# Patient Record
Sex: MALE | Race: WHITE | NOT HISPANIC OR LATINO | URBAN - METROPOLITAN AREA
[De-identification: names, ages, dates, MRNs, and addresses within clinical notes are randomized per-mention and may not be internally consistent; named-entity substitution may affect disease eponyms.]

---

## 2018-04-18 ENCOUNTER — EMERGENCY (EMERGENCY)
Facility: HOSPITAL | Age: 33
LOS: 1 days | Discharge: HOME | End: 2018-04-18
Attending: EMERGENCY MEDICINE | Admitting: EMERGENCY MEDICINE
Payer: MEDICAID

## 2018-04-18 VITALS
HEART RATE: 99 BPM | OXYGEN SATURATION: 98 % | WEIGHT: 190.04 LBS | TEMPERATURE: 98 F | RESPIRATION RATE: 18 BRPM | HEIGHT: 71 IN | DIASTOLIC BLOOD PRESSURE: 77 MMHG | SYSTOLIC BLOOD PRESSURE: 108 MMHG

## 2018-04-18 PROCEDURE — 99283 EMERGENCY DEPT VISIT LOW MDM: CPT

## 2018-04-18 PROCEDURE — 73130 X-RAY EXAM OF HAND: CPT

## 2018-04-18 PROCEDURE — 99283 EMERGENCY DEPT VISIT LOW MDM: CPT | Mod: 25

## 2018-04-18 NOTE — ED ADULT NURSE NOTE - MUSCULOSKELETAL WDL
Full range of motion of upper and lower extremities, no joint tenderness/swelling. pain right wrist area

## 2018-04-19 PROCEDURE — 73130 X-RAY EXAM OF HAND: CPT | Mod: 26,RT

## 2018-04-19 NOTE — ED ADULT NURSE REASSESSMENT NOTE - NS ED NURSE REASSESS COMMENT FT1
seen and examined by dr. tan. to have x-ray. pt declines pain medications at this time.
pt seen and examined by dr. tan. xray completed. md aware of the results. pt d/c home.

## 2018-10-08 ENCOUNTER — NON-APPOINTMENT (OUTPATIENT)
Age: 33
End: 2018-10-08

## 2018-10-08 ENCOUNTER — APPOINTMENT (OUTPATIENT)
Dept: INTERNAL MEDICINE | Facility: CLINIC | Age: 33
End: 2018-10-08
Payer: SELF-PAY

## 2018-10-08 ENCOUNTER — FORM ENCOUNTER (OUTPATIENT)
Age: 33
End: 2018-10-08

## 2018-10-08 VITALS
OXYGEN SATURATION: 99 % | WEIGHT: 181 LBS | DIASTOLIC BLOOD PRESSURE: 64 MMHG | HEART RATE: 47 BPM | HEIGHT: 69 IN | TEMPERATURE: 97.8 F | BODY MASS INDEX: 26.81 KG/M2 | SYSTOLIC BLOOD PRESSURE: 104 MMHG

## 2018-10-08 DIAGNOSIS — Z78.9 OTHER SPECIFIED HEALTH STATUS: ICD-10-CM

## 2018-10-08 DIAGNOSIS — Z87.891 PERSONAL HISTORY OF NICOTINE DEPENDENCE: ICD-10-CM

## 2018-10-08 DIAGNOSIS — F41.9 ANXIETY DISORDER, UNSPECIFIED: ICD-10-CM

## 2018-10-08 DIAGNOSIS — I49.9 CARDIAC ARRHYTHMIA, UNSPECIFIED: ICD-10-CM

## 2018-10-08 DIAGNOSIS — Z83.3 FAMILY HISTORY OF DIABETES MELLITUS: ICD-10-CM

## 2018-10-08 DIAGNOSIS — Z98.890 OTHER SPECIFIED POSTPROCEDURAL STATES: ICD-10-CM

## 2018-10-08 DIAGNOSIS — K21.9 GASTRO-ESOPHAGEAL REFLUX DISEASE W/OUT ESOPHAGITIS: ICD-10-CM

## 2018-10-08 DIAGNOSIS — Z82.3 FAMILY HISTORY OF STROKE: ICD-10-CM

## 2018-10-08 PROCEDURE — 83014 H PYLORI DRUG ADMIN: CPT

## 2018-10-08 PROCEDURE — 99204 OFFICE O/P NEW MOD 45 MIN: CPT | Mod: 25

## 2018-10-08 PROCEDURE — 93000 ELECTROCARDIOGRAM COMPLETE: CPT

## 2018-10-08 PROCEDURE — 36415 COLL VENOUS BLD VENIPUNCTURE: CPT

## 2018-10-08 NOTE — HISTORY OF PRESENT ILLNESS
[FreeTextEntry8] : chronic epigastric pain\par - started 2 yrs ago while abroad in [place]\par - became sick w/ NBNB vomiting and nausea\par - was evaluated by MD, US showed GB polyp was told it was food poisoning and to have f/u US\par - since then has had epigastric pain. non radiating, no alleviating factors, worse w/ eating or drinking carbonated beverages\par - a/w acid reflux\par \par anxiety/arrhythmia\par - treated w/ meditation and yoga\par - palpitations are transient but noticeable\par - unsure if anxiety causes palpitations or palpitations cause anxiety\par \par new onset constipation\par - previously had BM twice a day\par - over past 2 weeks developed acute change, goes maybe 1/week and passes small pebble sized BMs\par

## 2018-10-08 NOTE — PHYSICAL EXAM
[No Acute Distress] : no acute distress [Well Nourished] : well nourished [Well Developed] : well developed [Well-Appearing] : well-appearing [Normal Sclera/Conjunctiva] : normal sclera/conjunctiva [EOMI] : extraocular movements intact [No Respiratory Distress] : no respiratory distress  [Clear to Auscultation] : lungs were clear to auscultation bilaterally [No Accessory Muscle Use] : no accessory muscle use [Normal Rate] : normal rate  [No Edema] : there was no peripheral edema [Soft] : abdomen soft [Non-distended] : non-distended [No Masses] : no abdominal mass palpated [No Rash] : no rash [Normal Gait] : normal gait [Coordination Grossly Intact] : coordination grossly intact [Normal Affect] : the affect was normal [Alert and Oriented x3] : oriented to person, place, and time [Normal Mood] : the mood was normal [Normal Insight/Judgement] : insight and judgment were intact [de-identified] : mild epigastric tenderness to deep palpation

## 2018-10-08 NOTE — PHYSICAL EXAM
[No Acute Distress] : no acute distress [Well Nourished] : well nourished [Well Developed] : well developed [Well-Appearing] : well-appearing [Normal Sclera/Conjunctiva] : normal sclera/conjunctiva [EOMI] : extraocular movements intact [No Respiratory Distress] : no respiratory distress  [Clear to Auscultation] : lungs were clear to auscultation bilaterally [No Accessory Muscle Use] : no accessory muscle use [Normal Rate] : normal rate  [No Edema] : there was no peripheral edema [Soft] : abdomen soft [Non-distended] : non-distended [No Masses] : no abdominal mass palpated [No Rash] : no rash [Normal Gait] : normal gait [Coordination Grossly Intact] : coordination grossly intact [Normal Affect] : the affect was normal [Alert and Oriented x3] : oriented to person, place, and time [Normal Mood] : the mood was normal [Normal Insight/Judgement] : insight and judgment were intact [de-identified] : mild epigastric tenderness to deep palpation

## 2018-10-08 NOTE — HEALTH RISK ASSESSMENT
[No falls in past year] : Patient reported no falls in the past year [0] : 2) Feeling down, depressed, or hopeless: Not at all (0) [[id number]] : 0

## 2018-10-08 NOTE — HEALTH RISK ASSESSMENT
[No falls in past year] : Patient reported no falls in the past year [0] : 2) Feeling down, depressed, or hopeless: Not at all (0) [[id number]] : 0

## 2018-10-09 ENCOUNTER — OUTPATIENT (OUTPATIENT)
Dept: OUTPATIENT SERVICES | Facility: HOSPITAL | Age: 33
LOS: 1 days | End: 2018-10-09
Payer: COMMERCIAL

## 2018-10-09 ENCOUNTER — APPOINTMENT (OUTPATIENT)
Dept: ULTRASOUND IMAGING | Facility: HOSPITAL | Age: 33
End: 2018-10-09
Payer: SELF-PAY

## 2018-10-09 PROCEDURE — 74018 RADEX ABDOMEN 1 VIEW: CPT | Mod: 26

## 2018-10-09 PROCEDURE — 74018 RADEX ABDOMEN 1 VIEW: CPT

## 2018-10-09 PROCEDURE — 76700 US EXAM ABDOM COMPLETE: CPT | Mod: 26

## 2018-10-09 PROCEDURE — 76700 US EXAM ABDOM COMPLETE: CPT

## 2018-10-11 LAB
25(OH)D3 SERPL-MCNC: 24.7 NG/ML
ALBUMIN SERPL ELPH-MCNC: 4.9 G/DL
ALP BLD-CCNC: 51 U/L
ALT SERPL-CCNC: 16 U/L
AMYLASE/CREAT SERPL: 54 U/L
ANION GAP SERPL CALC-SCNC: 11 MMOL/L
AST SERPL-CCNC: 13 U/L
BASOPHILS # BLD AUTO: 0.02 K/UL
BASOPHILS NFR BLD AUTO: 0.4 %
BILIRUB SERPL-MCNC: 1.5 MG/DL
BUN SERPL-MCNC: 12 MG/DL
CALCIUM SERPL-MCNC: 10 MG/DL
CHLORIDE SERPL-SCNC: 105 MMOL/L
CHOLEST SERPL-MCNC: 146 MG/DL
CHOLEST/HDLC SERPL: 2.7 RATIO
CO2 SERPL-SCNC: 29 MMOL/L
CREAT SERPL-MCNC: 0.77 MG/DL
CRP SERPL-MCNC: <0.1 MG/DL
EOSINOPHIL # BLD AUTO: 0.14 K/UL
EOSINOPHIL NFR BLD AUTO: 2.9 %
GLUCOSE SERPL-MCNC: 86 MG/DL
HBA1C MFR BLD HPLC: 5.2 %
HCT VFR BLD CALC: 43.9 %
HCV AB SER QL: NONREACTIVE
HCV S/CO RATIO: 0.1 S/CO
HDLC SERPL-MCNC: 54 MG/DL
HGB BLD-MCNC: 15 G/DL
IMM GRANULOCYTES NFR BLD AUTO: 0.2 %
LDLC SERPL CALC-MCNC: 81 MG/DL
LPL SERPL-CCNC: 26 U/L
LYMPHOCYTES # BLD AUTO: 2.24 K/UL
LYMPHOCYTES NFR BLD AUTO: 45.7 %
MAN DIFF?: NORMAL
MCHC RBC-ENTMCNC: 29.4 PG
MCHC RBC-ENTMCNC: 34.2 GM/DL
MCV RBC AUTO: 86.1 FL
MONOCYTES # BLD AUTO: 0.39 K/UL
MONOCYTES NFR BLD AUTO: 8 %
NEUTROPHILS # BLD AUTO: 2.1 K/UL
NEUTROPHILS NFR BLD AUTO: 42.8 %
PLATELET # BLD AUTO: 220 K/UL
POTASSIUM SERPL-SCNC: 4.6 MMOL/L
PROT SERPL-MCNC: 7.2 G/DL
RBC # BLD: 5.1 M/UL
RBC # FLD: 13.1 %
SODIUM SERPL-SCNC: 145 MMOL/L
STRONGYLOIDES AB SER IA-ACNC: NEGATIVE
T4 FREE SERPL-MCNC: 1.4 NG/DL
TRIGL SERPL-MCNC: 55 MG/DL
TSH SERPL-ACNC: 2.74 UIU/ML
UREA BREATH TEST QL: POSITIVE
WBC # FLD AUTO: 4.9 K/UL

## 2018-11-19 ENCOUNTER — MEDICATION RENEWAL (OUTPATIENT)
Age: 33
End: 2018-11-19

## 2018-11-21 ENCOUNTER — APPOINTMENT (OUTPATIENT)
Dept: INTERNAL MEDICINE | Facility: CLINIC | Age: 33
End: 2018-11-21
Payer: SELF-PAY

## 2018-11-21 ENCOUNTER — LABORATORY RESULT (OUTPATIENT)
Age: 33
End: 2018-11-21

## 2018-11-21 VITALS
SYSTOLIC BLOOD PRESSURE: 101 MMHG | DIASTOLIC BLOOD PRESSURE: 68 MMHG | BODY MASS INDEX: 25.18 KG/M2 | OXYGEN SATURATION: 99 % | HEIGHT: 69 IN | TEMPERATURE: 98 F | WEIGHT: 170 LBS | HEART RATE: 74 BPM

## 2018-11-21 DIAGNOSIS — Z00.00 ENCOUNTER FOR GENERAL ADULT MEDICAL EXAMINATION W/OUT ABNORMAL FINDINGS: ICD-10-CM

## 2018-11-21 PROCEDURE — 99214 OFFICE O/P EST MOD 30 MIN: CPT

## 2018-11-21 RX ORDER — AMOXICILLIN 500 MG/1
500 TABLET, FILM COATED ORAL TWICE DAILY
Qty: 56 | Refills: 0 | Status: DISCONTINUED | COMMUNITY
Start: 2018-10-11 | End: 2018-11-21

## 2018-11-21 RX ORDER — CLARITHROMYCIN 500 MG/1
500 TABLET, FILM COATED ORAL
Qty: 28 | Refills: 0 | Status: DISCONTINUED | COMMUNITY
Start: 2018-10-11 | End: 2018-11-21

## 2018-11-21 RX ORDER — OMEPRAZOLE 20 MG/1
20 CAPSULE, DELAYED RELEASE ORAL TWICE DAILY
Qty: 28 | Refills: 0 | Status: DISCONTINUED | COMMUNITY
Start: 2018-10-11 | End: 2018-11-21

## 2018-11-21 RX ORDER — OMEPRAZOLE 40 MG/1
40 CAPSULE, DELAYED RELEASE ORAL
Qty: 30 | Refills: 0 | Status: DISCONTINUED | COMMUNITY
Start: 2018-10-08 | End: 2018-11-21

## 2018-11-21 NOTE — PHYSICAL EXAM
[No Acute Distress] : no acute distress [Well Nourished] : well nourished [Well Developed] : well developed [Well-Appearing] : well-appearing [Normal Sclera/Conjunctiva] : normal sclera/conjunctiva [PERRL] : pupils equal round and reactive to light [EOMI] : extraocular movements intact [Normal Outer Ear/Nose] : the outer ears and nose were normal in appearance [No JVD] : no jugular venous distention [No Respiratory Distress] : no respiratory distress  [No Accessory Muscle Use] : no accessory muscle use [Normal Posterior Cervical Nodes] : no posterior cervical lymphadenopathy [Normal Gait] : normal gait [Normal Affect] : the affect was normal [Alert and Oriented x3] : oriented to person, place, and time [Normal Insight/Judgement] : insight and judgment were intact

## 2018-11-21 NOTE — HISTORY OF PRESENT ILLNESS
[FreeTextEntry1] : follow up [de-identified] : unintentional weight loss\par - around 9-10lbs over the past month\par - states he is not hungr and when he does eat is full very quickly\par - wife is concerned about parasites, last eosinophil count normal, strongyloids ab negative.\par \par H pylori\par - only completed 10 of 14 days\par - says he felt better and did not want to complete medication, was too many pills\par \par Consitpation\par - takes miralax and prune juice\par - never went to go see gastro

## 2018-11-26 LAB
APPEARANCE: CLEAR
BILIRUBIN URINE: NEGATIVE
BLOOD URINE: NEGATIVE
COLOR: ABNORMAL
GLUCOSE QUALITATIVE U: NEGATIVE MG/DL
KETONES URINE: NEGATIVE
LEUKOCYTE ESTERASE URINE: NEGATIVE
NITRITE URINE: NEGATIVE
PH URINE: 5.5
PROTEIN URINE: NEGATIVE MG/DL
SPECIFIC GRAVITY URINE: 1.02
UROBILINOGEN URINE: 1 MG/DL

## 2019-01-14 ENCOUNTER — APPOINTMENT (OUTPATIENT)
Dept: INTERNAL MEDICINE | Facility: CLINIC | Age: 34
End: 2019-01-14
Payer: MEDICAID

## 2019-01-14 VITALS
BODY MASS INDEX: 24.88 KG/M2 | HEART RATE: 72 BPM | OXYGEN SATURATION: 99 % | SYSTOLIC BLOOD PRESSURE: 99 MMHG | TEMPERATURE: 98.1 F | DIASTOLIC BLOOD PRESSURE: 61 MMHG | WEIGHT: 168 LBS | HEIGHT: 69 IN

## 2019-01-14 DIAGNOSIS — G89.29 EPIGASTRIC PAIN: ICD-10-CM

## 2019-01-14 DIAGNOSIS — R10.13 EPIGASTRIC PAIN: ICD-10-CM

## 2019-01-14 DIAGNOSIS — K29.70 GASTRITIS, UNSPECIFIED, W/OUT BLEEDING: ICD-10-CM

## 2019-01-14 PROCEDURE — 99214 OFFICE O/P EST MOD 30 MIN: CPT | Mod: 25

## 2019-01-14 PROCEDURE — 83014 H PYLORI DRUG ADMIN: CPT

## 2019-01-14 RX ORDER — OMEPRAZOLE 20 MG/1
20 CAPSULE, DELAYED RELEASE ORAL
Qty: 30 | Refills: 0 | Status: ACTIVE | COMMUNITY
Start: 2019-01-14 | End: 1900-01-01

## 2019-01-14 RX ORDER — POLYETHYLENE GLYCOL 3350 17 G/17G
17 POWDER, FOR SOLUTION ORAL DAILY
Qty: 30 | Refills: 3 | Status: COMPLETED | COMMUNITY
Start: 2018-10-11 | End: 2019-01-14

## 2019-01-14 NOTE — PHYSICAL EXAM
[No Acute Distress] : no acute distress [Well Nourished] : well nourished [Well Developed] : well developed [Normal Sclera/Conjunctiva] : normal sclera/conjunctiva [EOMI] : extraocular movements intact [No Respiratory Distress] : no respiratory distress  [Normal Rate] : normal rate  [No Edema] : there was no peripheral edema [Soft] : abdomen soft [Non Tender] : non-tender [Non-distended] : non-distended [No Masses] : no abdominal mass palpated [Normal Supraclavicular Nodes] : no supraclavicular lymphadenopathy [Normal Anterior Cervical Nodes] : no anterior cervical lymphadenopathy [Normal Gait] : normal gait [Normal Affect] : the affect was normal [Normal Mood] : the mood was normal [Normal Insight/Judgement] : insight and judgment were intact

## 2019-01-14 NOTE — HISTORY OF PRESENT ILLNESS
[FreeTextEntry1] : follow up [de-identified] : H pylori\par - s/p EGD 1/7/19 awaiting path results\par - noted to have gastritis, started on PPI\par \par weight loss\par - continues to lose weight\par - avoid dairy, has good appetitie otherwise\par - scheduled for CT abdo/pelvis by GI\par - denies B symptoms\par \par Constipation\par - better w/ increased fiber and dried fruit in diet.

## 2019-01-17 LAB — UREA BREATH TEST QL: POSITIVE

## 2019-04-30 ENCOUNTER — APPOINTMENT (OUTPATIENT)
Dept: INTERNAL MEDICINE | Facility: CLINIC | Age: 34
End: 2019-04-30
Payer: MEDICAID

## 2019-04-30 DIAGNOSIS — A04.8 OTHER SPECIFIED BACTERIAL INTESTINAL INFECTIONS: ICD-10-CM

## 2019-04-30 PROCEDURE — 83014 H PYLORI DRUG ADMIN: CPT

## 2019-05-03 LAB — UREA BREATH TEST QL: POSITIVE

## 2019-05-17 ENCOUNTER — EMERGENCY (EMERGENCY)
Facility: HOSPITAL | Age: 34
LOS: 1 days | Discharge: HOME | End: 2019-05-17
Attending: EMERGENCY MEDICINE | Admitting: EMERGENCY MEDICINE
Payer: COMMERCIAL

## 2019-05-17 VITALS
RESPIRATION RATE: 18 BRPM | HEART RATE: 65 BPM | SYSTOLIC BLOOD PRESSURE: 120 MMHG | OXYGEN SATURATION: 100 % | TEMPERATURE: 98 F | DIASTOLIC BLOOD PRESSURE: 67 MMHG

## 2019-05-17 VITALS
OXYGEN SATURATION: 100 % | DIASTOLIC BLOOD PRESSURE: 47 MMHG | RESPIRATION RATE: 15 BRPM | TEMPERATURE: 98 F | HEART RATE: 59 BPM | SYSTOLIC BLOOD PRESSURE: 104 MMHG

## 2019-05-17 LAB
ALBUMIN SERPL ELPH-MCNC: 4.4 G/DL — SIGNIFICANT CHANGE UP (ref 3.3–5)
ALP SERPL-CCNC: 64 U/L — SIGNIFICANT CHANGE UP (ref 40–120)
ALT FLD-CCNC: 13 U/L — SIGNIFICANT CHANGE UP (ref 4–41)
ANION GAP SERPL CALC-SCNC: 9 MMO/L — SIGNIFICANT CHANGE UP (ref 7–14)
AST SERPL-CCNC: 17 U/L — SIGNIFICANT CHANGE UP (ref 4–40)
BASOPHILS # BLD AUTO: 0.02 K/UL — SIGNIFICANT CHANGE UP (ref 0–0.2)
BASOPHILS NFR BLD AUTO: 0.3 % — SIGNIFICANT CHANGE UP (ref 0–2)
BILIRUB SERPL-MCNC: 0.8 MG/DL — SIGNIFICANT CHANGE UP (ref 0.2–1.2)
BUN SERPL-MCNC: 16 MG/DL — SIGNIFICANT CHANGE UP (ref 7–23)
CALCIUM SERPL-MCNC: 9.6 MG/DL — SIGNIFICANT CHANGE UP (ref 8.4–10.5)
CHLORIDE SERPL-SCNC: 104 MMOL/L — SIGNIFICANT CHANGE UP (ref 98–107)
CO2 SERPL-SCNC: 29 MMOL/L — SIGNIFICANT CHANGE UP (ref 22–31)
CREAT SERPL-MCNC: 0.8 MG/DL — SIGNIFICANT CHANGE UP (ref 0.5–1.3)
EOSINOPHIL # BLD AUTO: 0.29 K/UL — SIGNIFICANT CHANGE UP (ref 0–0.5)
EOSINOPHIL NFR BLD AUTO: 3.9 % — SIGNIFICANT CHANGE UP (ref 0–6)
GLUCOSE SERPL-MCNC: 85 MG/DL — SIGNIFICANT CHANGE UP (ref 70–99)
HCT VFR BLD CALC: 40.1 % — SIGNIFICANT CHANGE UP (ref 39–50)
HGB BLD-MCNC: 13.5 G/DL — SIGNIFICANT CHANGE UP (ref 13–17)
IMM GRANULOCYTES NFR BLD AUTO: 0.3 % — SIGNIFICANT CHANGE UP (ref 0–1.5)
LIDOCAIN IGE QN: 32.6 U/L — SIGNIFICANT CHANGE UP (ref 7–60)
LYMPHOCYTES # BLD AUTO: 2.71 K/UL — SIGNIFICANT CHANGE UP (ref 1–3.3)
LYMPHOCYTES # BLD AUTO: 36.4 % — SIGNIFICANT CHANGE UP (ref 13–44)
MCHC RBC-ENTMCNC: 28 PG — SIGNIFICANT CHANGE UP (ref 27–34)
MCHC RBC-ENTMCNC: 33.7 % — SIGNIFICANT CHANGE UP (ref 32–36)
MCV RBC AUTO: 83.2 FL — SIGNIFICANT CHANGE UP (ref 80–100)
MONOCYTES # BLD AUTO: 0.52 K/UL — SIGNIFICANT CHANGE UP (ref 0–0.9)
MONOCYTES NFR BLD AUTO: 7 % — SIGNIFICANT CHANGE UP (ref 2–14)
NEUTROPHILS # BLD AUTO: 3.89 K/UL — SIGNIFICANT CHANGE UP (ref 1.8–7.4)
NEUTROPHILS NFR BLD AUTO: 52.1 % — SIGNIFICANT CHANGE UP (ref 43–77)
NRBC # FLD: 0.02 K/UL — SIGNIFICANT CHANGE UP (ref 0–0)
PLATELET # BLD AUTO: 234 K/UL — SIGNIFICANT CHANGE UP (ref 150–400)
PMV BLD: 10 FL — SIGNIFICANT CHANGE UP (ref 7–13)
POTASSIUM SERPL-MCNC: 4 MMOL/L — SIGNIFICANT CHANGE UP (ref 3.5–5.3)
POTASSIUM SERPL-SCNC: 4 MMOL/L — SIGNIFICANT CHANGE UP (ref 3.5–5.3)
PROT SERPL-MCNC: 7.1 G/DL — SIGNIFICANT CHANGE UP (ref 6–8.3)
RBC # BLD: 4.82 M/UL — SIGNIFICANT CHANGE UP (ref 4.2–5.8)
RBC # FLD: 12.4 % — SIGNIFICANT CHANGE UP (ref 10.3–14.5)
SODIUM SERPL-SCNC: 142 MMOL/L — SIGNIFICANT CHANGE UP (ref 135–145)
WBC # BLD: 7.45 K/UL — SIGNIFICANT CHANGE UP (ref 3.8–10.5)
WBC # FLD AUTO: 7.45 K/UL — SIGNIFICANT CHANGE UP (ref 3.8–10.5)

## 2019-05-17 PROCEDURE — 76705 ECHO EXAM OF ABDOMEN: CPT | Mod: 26

## 2019-05-17 PROCEDURE — 99284 EMERGENCY DEPT VISIT MOD MDM: CPT

## 2019-05-17 PROCEDURE — 71046 X-RAY EXAM CHEST 2 VIEWS: CPT | Mod: 26

## 2019-05-17 RX ORDER — FAMOTIDINE 10 MG/ML
20 INJECTION INTRAVENOUS ONCE
Refills: 0 | Status: COMPLETED | OUTPATIENT
Start: 2019-05-17 | End: 2019-05-17

## 2019-05-17 RX ORDER — SODIUM CHLORIDE 9 MG/ML
1000 INJECTION INTRAMUSCULAR; INTRAVENOUS; SUBCUTANEOUS ONCE
Refills: 0 | Status: COMPLETED | OUTPATIENT
Start: 2019-05-17 | End: 2019-05-17

## 2019-05-17 RX ADMIN — FAMOTIDINE 20 MILLIGRAM(S): 10 INJECTION INTRAVENOUS at 23:08

## 2019-05-17 RX ADMIN — Medication 30 MILLILITER(S): at 23:08

## 2019-05-17 RX ADMIN — SODIUM CHLORIDE 1000 MILLILITER(S): 9 INJECTION INTRAMUSCULAR; INTRAVENOUS; SUBCUTANEOUS at 23:10

## 2019-05-17 RX ADMIN — SODIUM CHLORIDE 1000 MILLILITER(S): 9 INJECTION INTRAMUSCULAR; INTRAVENOUS; SUBCUTANEOUS at 19:12

## 2019-05-17 NOTE — CONSULT NOTE ADULT - ASSESSMENT
33M w/hx of H pylori and PUD (diagnosed ~3 months ago on endoscopy - Dr. Manzanares of GI, s/p abx treatment x 2, not eradicated on recent urease breath test, no longer taking PPI) presents with 1-month h/o right-sided abdominal pain in setting of 60-lb weight loss, ongoing workup for ?malignancy.    - doubt cholecystitis in setting of normal WBC, no fever, no wall thickening/pericholecystic fluid on U/S, non-tender  - patient does continue to endorse right-sided pain  - recommend he follow up with his primary gastroenterologist given H Pylori that had been diagnosed recently reportedly not eradicated, no longer taking a PPI, and reports MRI done to "look at lymph nodes" recently as well as additional blood tests (and ?bone marrow biopsy that patient reports PCP was planning to do  - counseled patient to avoid fatty foods as this seems to aggravate his pain, and f/u with his PCP and GI; if episodes persistent he may elect to schedule laparoscopic cholecystectomy with Dr. Murray -- he was counseled that the gallstones may not be causing his pain and he may continue to suffer despite cholecystectomy if due to another etiology  - recommend PO challenge and d/c home, to f/u with Dr. Murray as outpatient  - d/w Dr. Murray, patient, and ED staff    BJ Randhawa, R4  [id number]

## 2019-05-17 NOTE — ED ADULT TRIAGE NOTE - CHIEF COMPLAINT QUOTE
Pt c/o R sided abd pain x 1 month that worsened. Denies N/V/D, urinary symptoms, fever/chills. Hx: H. pylori

## 2019-05-17 NOTE — ED ADULT NURSE NOTE - OBJECTIVE STATEMENT
Patient rec'd with RUQ pain x 1 month worse after eating and laying flat, denies n/v/d. Told last week from MRI has "gallbladder clusters" but benign. Also diagnosed with H.pylori last year. Denies fever/chills. Denies dysuria, abnormal bm.

## 2019-05-17 NOTE — ED PROVIDER NOTE - NSFOLLOWUPINSTRUCTIONS_ED_ALL_ED_FT
1. Follow up with Dr. Joshi as soon as possible.   2. Take Omeprazole as prescribed.  3. Return to the emergency department for high fevers, worsening pain or inability to take solids or liquids. 1. Follow up with Dr. Joshi as soon as possible.   [address] [facility] [address], [place] [id number] [phone]  2. Take Omeprazole as prescribed.  3. Return to the emergency department for high fevers, worsening pain or inability to take solids or liquids.

## 2019-05-17 NOTE — ED PROVIDER NOTE - ATTENDING CONTRIBUTION TO CARE
33M o/w healthy presents with epigastric and RUQ abdominal pain. Pain ongoing for the last month, is worse w PO intake. H/o PUD that continued after abx therapy, now no PPI. Had an MRI abd last week for chronic gastritis, told he had gallstones, has never had treatment or seen a surgeon. On exam well appearing, nad, mmm, rrr, lungs clear, abd + RUQ ttp, 2+ pulses, no edema, no rash, alert, speech clear. U/s c/w gallstones, seen by surgery w plan for outpatient f/u. Will recommend treatment for known gastritis, given GI f/u.

## 2019-05-17 NOTE — ED PROVIDER NOTE - OBJECTIVE STATEMENT
34 y/o M hx of PUD s/p tx for H. pylori presents with RUQ pain.     Patient states he has been experiencing persistent pain for a month, describes as crampy quality that is worse after food intake. He notes pain is different from burning, sharp pain that he felt was he was dx with H. pylori. Patient had an MRI one week prior and was told he had stones in his gallbladder but were 'small' No fevers, chills, N/V, or diarrhea but notes cough.

## 2019-05-17 NOTE — CONSULT NOTE ADULT - SUBJECTIVE AND OBJECTIVE BOX
GENERAL SURGERY CONSULT NOTE  Attending: Dr. Murray  Service: B team  Contact: [phone]    HPI: 33M w/hx of H pylori and PUD (diagnosed ~3 months ago on endoscopy - Dr. Manzanares of GI, s/p abx treatment x 2, not eradicated on recent urease breath test, no longer taking PPI) presents with 1-month h/o right-sided abdominal pain. Feels the pain is there all the time but made worse by fatty food intake. Usually lasts about an hour, sometimes radiates to the back. The pain kept him from sleep yesterday so he came to the ED today. Denies fevers, chills, nausea, emesis, changes in color of skin/urine/stool, dysuria, diarrhea/constipation. Also reports 60-lb weight loss over 18 months. During that time he had been in the country of [place] with his wife. Colonoscopy done at time of endoscopy a few months ago reportedly showed some small polyps. He also notes an MRI done last week that showed some "lymph nodes" near his stomach. He is having flatus and normal BMs.    ROS: negative except as noted above.    PMH/PSH  Peptic ulcer disease  Anxiety  s/p tonsillectomy    MEDICATIONS  none    Allergies  No Known Allergies    Social  Lives with wife and parents. Quit smoking 5 years ago. Denies ETOH.    Physical Exam  T(C): 36.6 (05-17-19 @ 20:36), Max: 36.8 (05-17-19 @ 17:14)  HR: 59 (05-17-19 @ 20:36) (59 - 65)  BP: 104/47 (05-17-19 @ 20:36) (104/47 - 120/67)  RR: 15 (05-17-19 @ 20:36) (15 - 18)  SpO2: 100% (05-17-19 @ 20:36) (100% - 100%)    Gen: NAD  Neuro: AAOx3  HEENT: normocephalic, atraumatic, no scleral icterus  CV: S1, S2, RRR  Pulm: CTA B/L  Abd: Soft, ND, NT, no rebound, no guarding, no palpable organomegaly/masses  Ext: warm, no edema    LABS                        13.5   7.45  )-----------( 234      ( 17 May 2019 19:30 )             40.1     05-17    142  |  104  |  16  ----------------------------<  85  4.0   |  29  |  0.80    Ca    9.6      17 May 2019 19:30    TPro  7.1  /  Alb  4.4  /  TBili  0.8  /  DBili  x   /  AST  17  /  ALT  13  /  AlkPhos  64  05-17    Lipase, Serum: 32.6 U/L (05.17.19 @ 19:30)    IMAGING  < from: US Abdomen Limited (05.17.19 @ 19:48) >  IMPRESSION:   Cholelithiasis without evidence of cholecystitis.    < end of copied text >

## 2019-05-17 NOTE — ED PROVIDER NOTE - CLINICAL SUMMARY MEDICAL DECISION MAKING FREE TEXT BOX
34 y/o M hx of PUD s/p tx for H. pylori presents with RUQ pain.   Appears to be distinct from epigastric pain associated with H.pylori. Likely biliary colic vs. pleurisy vs. MSK; PERC negative  Will require lab work, RUQ US, CXR, reassessment.

## 2019-05-17 NOTE — ED PROVIDER NOTE - PHYSICAL EXAMINATION
GENERAL: no acute distress  HEAD:  Atraumatic, Normocephalic  EYES: EOMI, PERRLA  ENT: MMM; oropharynx clear  NECK: Supple, No JVD  CHEST/LUNG: Clear to auscultation bilaterally; No wheeze  HEART: Regular rate and rhythm; No murmurs, rubs, or gallops  ABDOMEN: Soft, Nondistended; ttp of RUQ without rebound or rigidity; negative connors's sign.   : no CVA tenderness.   EXTREMITIES:  2+ Peripheral Pulses, No clubbing, cyanosis, or edema  PSYCH: AAOx3  NEUROLOGY: no focal motor or sensory deficits. 5/5 muscle strength in all extremities.   SKIN: No rashes or lesions

## 2019-05-17 NOTE — ED ADULT NURSE NOTE - NSIMPLEMENTINTERV_GEN_ALL_ED
Implemented All Universal Safety Interventions:  [place] to call system. Call bell, personal items and telephone within reach. Instruct patient to call for assistance. Room bathroom lighting operational. Non-slip footwear when patient is off stretcher. Physically safe environment: no spills, clutter or unnecessary equipment. Stretcher in lowest position, wheels locked, appropriate side rails in place.

## 2019-05-18 ENCOUNTER — EMERGENCY (EMERGENCY)
Facility: HOSPITAL | Age: 34
LOS: 1 days | Discharge: HOME | End: 2019-05-18
Attending: EMERGENCY MEDICINE | Admitting: EMERGENCY MEDICINE
Payer: COMMERCIAL

## 2019-05-18 VITALS
HEART RATE: 63 BPM | OXYGEN SATURATION: 100 % | DIASTOLIC BLOOD PRESSURE: 54 MMHG | TEMPERATURE: 98 F | RESPIRATION RATE: 18 BRPM | SYSTOLIC BLOOD PRESSURE: 129 MMHG

## 2019-05-18 VITALS
SYSTOLIC BLOOD PRESSURE: 108 MMHG | HEART RATE: 62 BPM | RESPIRATION RATE: 18 BRPM | TEMPERATURE: 98 F | DIASTOLIC BLOOD PRESSURE: 62 MMHG | OXYGEN SATURATION: 100 %

## 2019-05-18 LAB
ALBUMIN SERPL ELPH-MCNC: 4.1 G/DL — SIGNIFICANT CHANGE UP (ref 3.3–5)
ALP SERPL-CCNC: 62 U/L — SIGNIFICANT CHANGE UP (ref 40–120)
ALT FLD-CCNC: 15 U/L — SIGNIFICANT CHANGE UP (ref 4–41)
ANION GAP SERPL CALC-SCNC: 9 MMO/L — SIGNIFICANT CHANGE UP (ref 7–14)
AST SERPL-CCNC: 11 U/L — SIGNIFICANT CHANGE UP (ref 4–40)
BILIRUB SERPL-MCNC: 0.7 MG/DL — SIGNIFICANT CHANGE UP (ref 0.2–1.2)
BUN SERPL-MCNC: 13 MG/DL — SIGNIFICANT CHANGE UP (ref 7–23)
CALCIUM SERPL-MCNC: 9.1 MG/DL — SIGNIFICANT CHANGE UP (ref 8.4–10.5)
CHLORIDE SERPL-SCNC: 105 MMOL/L — SIGNIFICANT CHANGE UP (ref 98–107)
CO2 SERPL-SCNC: 28 MMOL/L — SIGNIFICANT CHANGE UP (ref 22–31)
CREAT SERPL-MCNC: 0.74 MG/DL — SIGNIFICANT CHANGE UP (ref 0.5–1.3)
GLUCOSE SERPL-MCNC: 97 MG/DL — SIGNIFICANT CHANGE UP (ref 70–99)
HCT VFR BLD CALC: 37.6 % — LOW (ref 39–50)
HGB BLD-MCNC: 13.2 G/DL — SIGNIFICANT CHANGE UP (ref 13–17)
LIDOCAIN IGE QN: 28 U/L — SIGNIFICANT CHANGE UP (ref 7–60)
MCHC RBC-ENTMCNC: 29.4 PG — SIGNIFICANT CHANGE UP (ref 27–34)
MCHC RBC-ENTMCNC: 35.1 % — SIGNIFICANT CHANGE UP (ref 32–36)
MCV RBC AUTO: 83.7 FL — SIGNIFICANT CHANGE UP (ref 80–100)
NRBC # FLD: 0 K/UL — SIGNIFICANT CHANGE UP (ref 0–0)
PLATELET # BLD AUTO: 211 K/UL — SIGNIFICANT CHANGE UP (ref 150–400)
PMV BLD: 9.7 FL — SIGNIFICANT CHANGE UP (ref 7–13)
POTASSIUM SERPL-MCNC: 3.5 MMOL/L — SIGNIFICANT CHANGE UP (ref 3.5–5.3)
POTASSIUM SERPL-SCNC: 3.5 MMOL/L — SIGNIFICANT CHANGE UP (ref 3.5–5.3)
PROT SERPL-MCNC: 6.6 G/DL — SIGNIFICANT CHANGE UP (ref 6–8.3)
RBC # BLD: 4.49 M/UL — SIGNIFICANT CHANGE UP (ref 4.2–5.8)
RBC # FLD: 12.3 % — SIGNIFICANT CHANGE UP (ref 10.3–14.5)
SODIUM SERPL-SCNC: 142 MMOL/L — SIGNIFICANT CHANGE UP (ref 135–145)
WBC # BLD: 8.05 K/UL — SIGNIFICANT CHANGE UP (ref 3.8–10.5)
WBC # FLD AUTO: 8.05 K/UL — SIGNIFICANT CHANGE UP (ref 3.8–10.5)

## 2019-05-18 PROCEDURE — 74177 CT ABD & PELVIS W/CONTRAST: CPT | Mod: 26

## 2019-05-18 PROCEDURE — 99285 EMERGENCY DEPT VISIT HI MDM: CPT | Mod: 25

## 2019-05-18 RX ORDER — MORPHINE SULFATE 50 MG/1
6 CAPSULE, EXTENDED RELEASE ORAL ONCE
Refills: 0 | Status: DISCONTINUED | OUTPATIENT
Start: 2019-05-18 | End: 2019-05-18

## 2019-05-18 RX ORDER — OXYCODONE HYDROCHLORIDE 5 MG/1
1 TABLET ORAL
Qty: 12 | Refills: 0
Start: 2019-05-18 | End: 2019-05-20

## 2019-05-18 RX ADMIN — MORPHINE SULFATE 6 MILLIGRAM(S): 50 CAPSULE, EXTENDED RELEASE ORAL at 04:30

## 2019-05-18 RX ADMIN — MORPHINE SULFATE 6 MILLIGRAM(S): 50 CAPSULE, EXTENDED RELEASE ORAL at 04:15

## 2019-05-18 NOTE — ED PROVIDER NOTE - NSFOLLOWUPINSTRUCTIONS_ED_ALL_ED_FT
You were seen at [facility] ER for abdominal pain in the right upper quadrant pain. You had normal lab work and a CT scan showing Nonspecific infiltration of the fat in the subhepatic space. Your pain was also controlled at this time.     You are to follow-up with your gastroenterologist who will evaluate you for further need for Endoscopy and should consider checking IgG4 levels at this time as well. You should also get a repeat MRI of the abdomen in 4 weeks to further evaluate this inflammation.     You should also follow-up with the surgeons as discussed to decide whether to have your gallbladder removed in the near future. Please call the Surgeons office below for an appointment.     You can take oxycodone 5mg every 4 to 6 hours as needed for severe pain.     Please return for worsening abdominal pain resistant to pain medication, persistent vomiting, inability to tolerate oral intake, or any other concerns.

## 2019-05-18 NOTE — ED PROVIDER NOTE - OBJECTIVE STATEMENT
Pt is a 34 y/o M w/ a pMHx of gallstones and PUD s/p EGD and h. pylori treatment who presents to ER for persistent RUQ and RLQ pain for one month that is now more constant. could not take pain because he could not sleep. denies vomiting, fevers, dysuria, prior abdominal surgeries,     Patient states he has been experiencing persistent pain for a month, describes as crampy quality that is worse after food intake. He notes pain is different from burning, sharp pain that he felt was he was dx with H. pylori. Patient had an MRI one week prior and was told he had stones in his gallbladder but were 'small' No fevers, chills, N/V, or diarrhea. Patient just here yesterday morning and had RUQ US with gallstones and evaluated by surgery and discharged with follow-up. Tomasz states to see his GI soon for repeat H. pylori testing as has tested positive with breath test again. Started getting pain while oversees after URI symptoms.

## 2019-05-18 NOTE — ED PROVIDER NOTE - ATTENDING CONTRIBUTION TO CARE
33 year old returns to ED with right sided abd pain. us yesterday unrevealing. will ct for new path, appy vs colitis vs obstruction vs inflammation. labs, lipase, main control, reassess 33 year old returns to ED with right sided abd pain. us yesterday unrevealing. will ct for new path, appy vs colitis vs obstruction vs inflammation. labs, lipase, main control, reassess.     CON : NAD  EENT : EOMI, MMM  NECK : Full ROM  RESP : CTAB no increased WOB  CARD : rrr no m/r/g  ABD : ttp right sided abd. no reboudn or guarding  EXT : No edema  NEURO : AAOX3

## 2019-05-18 NOTE — ED PROVIDER NOTE - PROGRESS NOTE DETAILS
Patient pain better controlled and feels well enough to go home. CT showing fatty infiltration that is perihepatic. Discussed with patient needing another MRI in 4 weeks for resolution and GI and surgery follow-up for further eval. WIll discharge on 3 days worth of percocet.   -Torsten Torre PGY4 EMIM

## 2019-05-18 NOTE — ED ADULT NURSE REASSESSMENT NOTE - NS ED NURSE REASSESS COMMENT FT1
No acute distress at present. Respirations are even & unlabored on room air. Pt. is being discharged.

## 2019-05-18 NOTE — ED PROVIDER NOTE - FAMILY HISTORY
Father  Still living? Unknown  Family history of gastric ulcer, Age at diagnosis: Age Unknown     Grandparent  Still living? Unknown  Family history of gastric ulcer, Age at diagnosis: Age Unknown

## 2019-05-18 NOTE — ED PROVIDER NOTE - CLINICAL SUMMARY MEDICAL DECISION MAKING FREE TEXT BOX
Pt is a 32 y/o M w/ a pMHx of gallstones and PUD s/p EGD and h. pylori treatment who presents to ER for persistent RUQ and RLQ pain for one month differential includes nephrolithiasis, PUD, gastritis, Symptomatic cholelith. Will get CBC, CMP, lipase, CT abd/Pelvis, give morphine. If all normal will discharge with PO pain medication and GI follow-up.

## 2019-05-18 NOTE — ED ADULT TRIAGE NOTE - CHIEF COMPLAINT QUOTE
Pt was seen in this ER tonight and was discharged saying that he may have gastritis. ultrasound and x ray was done . Pt says his pain is worse now. Denies N/V/D.

## 2019-05-18 NOTE — ED ADULT NURSE NOTE - OBJECTIVE STATEMENT
Pt. received in room 1, A&Ox4, ambulatory. Pt. was seen in ER and discharged c/o abd pain x 1 month, worsening tonight. Pt. states pain is worse than previous pain. Denies n/v/d, fever, chills, dizziness, weakness, SOB, chest pain. Respirations are even and unlabored on room air. VS as noted. 20 gauge IV inserted in right ac, labs sent. MD evaluation in progress. Awaiting CT. Will continue to monitor.

## 2019-05-18 NOTE — ED PROVIDER NOTE - CARE PROVIDER_API CALL
Scar Murray)  Surgery; Surgical Critical Care  [address] [place] [address]  Phone: [phone]  Fax: [phone]  Follow Up Time:

## 2019-05-18 NOTE — ED PROVIDER NOTE - RESPIRATORY NORMAL BREATH SOUNDS
RIGHT UPPER LOBE/LEFT UPPER LOBE/RIGHT MIDDLE LOBE/RIGHT LOWER LOBE/Right CVA tenderness and flank tenderness to palpation

## 2019-05-20 ENCOUNTER — APPOINTMENT (OUTPATIENT)
Dept: INTERNAL MEDICINE | Facility: CLINIC | Age: 34
End: 2019-05-20
Payer: COMMERCIAL

## 2019-05-20 VITALS
HEIGHT: 69 IN | BODY MASS INDEX: 24.88 KG/M2 | SYSTOLIC BLOOD PRESSURE: 116 MMHG | HEART RATE: 69 BPM | OXYGEN SATURATION: 98 % | WEIGHT: 168 LBS | TEMPERATURE: 97.9 F | DIASTOLIC BLOOD PRESSURE: 70 MMHG

## 2019-05-20 DIAGNOSIS — K59.09 OTHER CONSTIPATION: ICD-10-CM

## 2019-05-20 PROCEDURE — 36415 COLL VENOUS BLD VENIPUNCTURE: CPT

## 2019-05-20 PROCEDURE — 99214 OFFICE O/P EST MOD 30 MIN: CPT | Mod: 25

## 2019-05-20 NOTE — PHYSICAL EXAM
[No Acute Distress] : no acute distress [Well Nourished] : well nourished [Well Developed] : well developed [Well-Appearing] : well-appearing [Normal Sclera/Conjunctiva] : normal sclera/conjunctiva [No Respiratory Distress] : no respiratory distress  [Normal Rate] : normal rate  [Regular Rhythm] : with a regular rhythm [No Edema] : there was no peripheral edema [Soft] : abdomen soft [Non Tender] : non-tender [Non-distended] : non-distended

## 2019-05-20 NOTE — HISTORY OF PRESENT ILLNESS
[FreeTextEntry8] : ED visit 5/17 and 5/18/19\par HPI:\par - presented to [facility] ED for sever RUQ pain\par - CT and US abdomen did not show any acute cholecystitis only cholelithiasis, moderate fecal burden, incidentally noted was right colon fat infiltration.\par - tx w/ PPIs and Hydration\par - surgery c/s feels sx are not from gallbladder, recommend cholecystectomy if sx persist but do not guarantee will resolve sx. \par - pt has also had 60lb weight loss over past 7 months\par - has 3 positive H pylori tests despite 2 cycles of treatment\par - had normal EGD and colonoscopy \par - pt also complains about early satiety \par - was evaluated by "cancer specialist" who did not find any malignancies\par - second ED visit was tx w/ IV morphine and d/c'ed w/ Percocet

## 2019-05-23 LAB
ALBUMIN MFR SERPL ELPH: 59.2 %
ALBUMIN SERPL-MCNC: 4 G/DL
ALBUMIN/GLOB SERPL: 1.5 RATIO
ALPHA1 GLOB MFR SERPL ELPH: 6.2 %
ALPHA1 GLOB SERPL ELPH-MCNC: 0.4 G/DL
ALPHA2 GLOB MFR SERPL ELPH: 8.6 %
ALPHA2 GLOB SERPL ELPH-MCNC: 0.6 G/DL
B-GLOBULIN MFR SERPL ELPH: 11.3 %
B-GLOBULIN SERPL ELPH-MCNC: 0.8 G/DL
CRP SERPL-MCNC: 1.61 MG/DL
GAMMA GLOB FLD ELPH-MCNC: 1 G/DL
GAMMA GLOB MFR SERPL ELPH: 14.7 %
INTERPRETATION SERPL IEP-IMP: NORMAL
LDH SERPL-CCNC: 151 U/L
PROT SERPL-MCNC: 6.7 G/DL
PROT SERPL-MCNC: 6.7 G/DL
PSA SERPL-MCNC: 0.69 NG/ML
T4 FREE SERPL-MCNC: 1.3 NG/DL
TSH SERPL-ACNC: 1.34 UIU/ML

## 2019-06-28 PROBLEM — K27.9 PEPTIC ULCER, SITE UNSPECIFIED, UNSPECIFIED AS ACUTE OR CHRONIC, WITHOUT HEMORRHAGE OR PERFORATION: Chronic | Status: ACTIVE | Noted: 2019-05-17

## 2019-07-19 ENCOUNTER — APPOINTMENT (OUTPATIENT)
Dept: INTERNAL MEDICINE | Facility: CLINIC | Age: 34
End: 2019-07-19
Payer: MEDICAID

## 2019-07-19 VITALS
WEIGHT: 168 LBS | SYSTOLIC BLOOD PRESSURE: 94 MMHG | HEIGHT: 69 IN | HEART RATE: 67 BPM | DIASTOLIC BLOOD PRESSURE: 62 MMHG | BODY MASS INDEX: 24.88 KG/M2 | OXYGEN SATURATION: 98 % | TEMPERATURE: 98.7 F

## 2019-07-19 DIAGNOSIS — L71.9 ROSACEA, UNSPECIFIED: ICD-10-CM

## 2019-07-19 DIAGNOSIS — R63.4 ABNORMAL WEIGHT LOSS: ICD-10-CM

## 2019-07-19 DIAGNOSIS — J30.9 ALLERGIC RHINITIS, UNSPECIFIED: ICD-10-CM

## 2019-07-19 DIAGNOSIS — A04.8 OTHER SPECIFIED BACTERIAL INTESTINAL INFECTIONS: ICD-10-CM

## 2019-07-19 DIAGNOSIS — Z87.19 PERSONAL HISTORY OF OTHER DISEASES OF THE DIGESTIVE SYSTEM: ICD-10-CM

## 2019-07-19 DIAGNOSIS — K82.4 CHOLESTEROLOSIS OF GALLBLADDER: ICD-10-CM

## 2019-07-19 PROCEDURE — 99214 OFFICE O/P EST MOD 30 MIN: CPT | Mod: 25

## 2019-07-19 PROCEDURE — 83014 H PYLORI DRUG ADMIN: CPT

## 2019-07-19 RX ORDER — FLUTICASONE PROPIONATE 50 UG/1
50 SPRAY, METERED NASAL DAILY
Qty: 3 | Refills: 3 | Status: ACTIVE | COMMUNITY
Start: 2019-07-19 | End: 1900-01-01

## 2019-07-19 RX ORDER — METRONIDAZOLE 7.5 MG/G
0.75 GEL TOPICAL TWICE DAILY
Qty: 1 | Refills: 6 | Status: ACTIVE | COMMUNITY
Start: 2019-07-19 | End: 1900-01-01

## 2019-07-19 NOTE — HISTORY OF PRESENT ILLNESS
[FreeTextEntry1] : follow up [de-identified] : cholecystectomy\par - about 3 weeks ago went to [facility] for abdominal pain\par - found to have acute cholecystitis\par \par H pylori\par - has not started any abx since last test\par - was supposed to f/u w/ GI 2 months ago but failed to make appt\par - was treated in [place] for ear infection and IM abx\par \par cough\par - c/o mildly productive cough x 5 months\par - yellow thick sputum\par - no fever or chills\par - a/w sore throat\par \par rash on face\par - has not seen derm\par - uses cream prescribed for father for similar rash.\par \par weight loss\par - follows w/ heme onc\par - states everythign is going well

## 2019-07-19 NOTE — PHYSICAL EXAM
[No Acute Distress] : no acute distress [Well Nourished] : well nourished [Well Developed] : well developed [Well-Appearing] : well-appearing [Normal Outer Ear/Nose] : the outer ears and nose were normal in appearance [Normal Oropharynx] : the oropharynx was normal [Normal TMs] : both tympanic membranes were normal [No Respiratory Distress] : no respiratory distress  [No Accessory Muscle Use] : no accessory muscle use [Normal Rate] : normal rate  [Regular Rhythm] : with a regular rhythm [No Edema] : there was no peripheral edema [Normal] : affect was normal and insight and judgment were intact [de-identified] : erythematous pustular rash b/l cheeks

## 2019-07-26 LAB — UREA BREATH TEST QL: POSITIVE

## 2021-03-20 ENCOUNTER — TRANSCRIPTION ENCOUNTER (OUTPATIENT)
Age: 36
End: 2021-03-20

## 2021-04-12 ENCOUNTER — EMERGENCY (EMERGENCY)
Facility: HOSPITAL | Age: 36
LOS: 1 days | Discharge: HOME | End: 2021-04-12
Attending: EMERGENCY MEDICINE | Admitting: EMERGENCY MEDICINE
Payer: COMMERCIAL

## 2021-04-12 VITALS
HEIGHT: 71 IN | RESPIRATION RATE: 18 BRPM | TEMPERATURE: 98 F | DIASTOLIC BLOOD PRESSURE: 65 MMHG | SYSTOLIC BLOOD PRESSURE: 127 MMHG | OXYGEN SATURATION: 100 % | WEIGHT: 195.11 LBS | HEART RATE: 60 BPM

## 2021-04-12 LAB
HCT VFR BLD CALC: 41.3 % — SIGNIFICANT CHANGE UP (ref 39–50)
HGB BLD-MCNC: 14.7 G/DL — SIGNIFICANT CHANGE UP (ref 13–17)
MCHC RBC-ENTMCNC: 29.5 PG — SIGNIFICANT CHANGE UP (ref 27–34)
MCHC RBC-ENTMCNC: 35.6 GM/DL — SIGNIFICANT CHANGE UP (ref 32–36)
MCV RBC AUTO: 82.8 FL — SIGNIFICANT CHANGE UP (ref 80–100)
NRBC # BLD: 0 /100 WBCS — SIGNIFICANT CHANGE UP (ref 0–0)
PLATELET # BLD AUTO: 267 K/UL — SIGNIFICANT CHANGE UP (ref 150–400)
RBC # BLD: 4.99 M/UL — SIGNIFICANT CHANGE UP (ref 4.2–5.8)
RBC # FLD: 12.1 % — SIGNIFICANT CHANGE UP (ref 10.3–14.5)
WBC # BLD: 9.06 K/UL — SIGNIFICANT CHANGE UP (ref 3.8–10.5)
WBC # FLD AUTO: 9.06 K/UL — SIGNIFICANT CHANGE UP (ref 3.8–10.5)

## 2021-04-12 PROCEDURE — 93005 ELECTROCARDIOGRAM TRACING: CPT

## 2021-04-12 PROCEDURE — 80053 COMPREHEN METABOLIC PANEL: CPT

## 2021-04-12 PROCEDURE — 83690 ASSAY OF LIPASE: CPT

## 2021-04-12 PROCEDURE — 99284 EMERGENCY DEPT VISIT MOD MDM: CPT | Mod: 25

## 2021-04-12 PROCEDURE — 85027 COMPLETE CBC AUTOMATED: CPT

## 2021-04-12 PROCEDURE — 99285 EMERGENCY DEPT VISIT HI MDM: CPT

## 2021-04-12 PROCEDURE — 93010 ELECTROCARDIOGRAM REPORT: CPT

## 2021-04-12 PROCEDURE — 96374 THER/PROPH/DIAG INJ IV PUSH: CPT

## 2021-04-12 PROCEDURE — 36415 COLL VENOUS BLD VENIPUNCTURE: CPT

## 2021-04-12 PROCEDURE — 96361 HYDRATE IV INFUSION ADD-ON: CPT

## 2021-04-12 RX ORDER — SODIUM CHLORIDE 9 MG/ML
1000 INJECTION INTRAMUSCULAR; INTRAVENOUS; SUBCUTANEOUS ONCE
Refills: 0 | Status: COMPLETED | OUTPATIENT
Start: 2021-04-12 | End: 2021-04-12

## 2021-04-12 RX ORDER — FAMOTIDINE 10 MG/ML
20 INJECTION INTRAVENOUS ONCE
Refills: 0 | Status: COMPLETED | OUTPATIENT
Start: 2021-04-12 | End: 2021-04-12

## 2021-04-12 RX ADMIN — Medication 1 MILLIGRAM(S): at 23:33

## 2021-04-12 RX ADMIN — FAMOTIDINE 20 MILLIGRAM(S): 10 INJECTION INTRAVENOUS at 23:33

## 2021-04-12 RX ADMIN — SODIUM CHLORIDE 1000 MILLILITER(S): 9 INJECTION INTRAMUSCULAR; INTRAVENOUS; SUBCUTANEOUS at 23:34

## 2021-04-12 RX ADMIN — Medication 30 MILLILITER(S): at 23:14

## 2021-04-12 NOTE — ED PROVIDER NOTE - NSFOLLOWUPINSTRUCTIONS_ED_ALL_ED_FT
Diet for Stomach Ulcers and Gastritis    AMBULATORY CARE:    A diet for stomach ulcers and gastritis is a meal plan that limits foods that irritate your stomach. Certain foods may worsen symptoms such as stomach pain, bloating, heartburn, or indigestion.    Foods to limit or avoid: You may need to avoid acidic, spicy, or high-fat foods. Not all foods affect everyone the same way. You will need to learn which foods worsen your symptoms and limit those foods. The following are some foods that may worsen ulcer or gastritis symptoms:  •Beverages: ?Whole milk and chocolate milk      ?Hot cocoa and cola      ?Any beverage with caffeine      ?Regular and decaffeinated coffee      ?Peppermint and spearmint tea      ?Green and black tea, with or without caffeine      ?Orange and grapefruit juices      ?Drinks that contain alcohol      •Spices and seasonings: ?Black and red pepper      ?Chili powder      ?Mustard seed and nutmeg      •Other foods: ?Dairy foods made from whole milk or cream      ?Chocolate      ?Spicy or strongly flavored cheeses, such as jalapeno or black pepper      ?Highly seasoned, high-fat meats, such as sausage, salami, moreno, ham, and cold cuts      ?Hot chiles and peppers      ?Tomato products, such as tomato paste, tomato sauce, or tomato juice        Foods to include: Eat a variety of healthy foods from all the food groups. Eat fruits, vegetables, whole grains, and fat-free or low-fat dairy foods. Whole grains include whole-wheat breads, cereals, pasta, and brown rice. Choose lean meats, poultry (chicken and turkey), fish, beans, eggs, and nuts. A healthy meal plan is low in unhealthy fats, salt, and added sugar. Healthy fats include olive oil and canola oil. Ask your dietitian for more information about a healthy diet.    Healthy Foods         Other helpful guidelines:   •Do not eat right before bedtime. Stop eating at least 2 hours before bedtime.      •Eat small, frequent meals. Your stomach may tolerate small, frequent meals better than large meals.    Gastritis    WHAT YOU NEED TO KNOW:    Gastritis is inflammation or irritation of the lining of your stomach.     Abdominal Organs         DISCHARGE INSTRUCTIONS:    Call 911 for any of the following:   •You develop chest pain or shortness of breath.          Return to the emergency department if:   •You vomit blood.      •You have black or bloody bowel movements.      •You have severe stomach or back pain.      Contact your healthcare provider if:   •You have a fever.      •You have new or worsening symptoms, even after treatment.      •You have questions or concerns about your condition or care.      Medicines:   •Medicines may be given to help treat a bacterial infection or decrease stomach acid.       •Take your medicine as directed. Contact your healthcare provider if you think your medicine is not helping or if you have side effects. Tell him or her if you are allergic to any medicine. Keep a list of the medicines, vitamins, and herbs you take. Include the amounts, and when and why you take them. Bring the list or the pill bottles to follow-up visits. Carry your medicine list with you in case of an emergency.      Manage or prevent gastritis:   •Do not smoke. Nicotine and other chemicals in cigarettes and cigars can make your symptoms worse and cause lung damage. Ask your healthcare provider for information if you currently smoke and need help to quit. E-cigarettes or smokeless tobacco still contain nicotine. Talk to your healthcare provider before you use these products.       •Do not drink alcohol. Alcohol can prevent healing and make your gastritis worse. Talk to your healthcare provider if you need help to stop drinking.      •Do not take NSAIDs or aspirin unless directed. These and similar medicines can cause irritation. If your healthcare provider says it is okay to take NSAIDs, take them with food.       •Do not eat foods that cause irritation. Foods such as oranges and salsa can cause burning or pain. Eat a variety of healthy foods. Examples include fruits (not citrus), vegetables, low-fat dairy products, beans, whole-grain breads, and lean meats and fish. Try to eat small meals, and drink water with your meals. Do not eat for at least 3 hours before you go to bed.      •Find ways to relax and decrease stress. Stress can increase stomach acid and make gastritis worse. Activities such as yoga, meditation, or listening to music can help you relax. Spend time with friends, or do things you enjoy.      Follow up with your healthcare provider as directed: You may need ongoing tests or treatment, or referral to a gastroenterologist. Write down your questions so you remember to ask them during your visits.      Panic Attack    WHAT YOU NEED TO KNOW:    A panic attack usually lasts 10 to 20 minutes and feels more serious than it is. Remind yourself that you are not in danger and that the attack will stop soon. Deep breathing can help stop a panic attack or keep it from becoming severe.    Heart Attack vs Panic Attack         DISCHARGE INSTRUCTIONS:    Call your local emergency number (911 in the [place]) if:   •You have any of the following signs of a heart attack: ?Squeezing, pressure, or pain in your chest      ?You may also have any of the following: ?Discomfort or pain in your back, neck, jaw, stomach, or arm      ?Shortness of breath      ?Nausea or vomiting      ?Lightheadedness or a sudden cold sweat            Call your doctor or therapist if:   •You have new or worsening panic attacks after treatment.      •You have questions or concerns about your condition or care.      Medicines:   •Medicines may be given to make you feel more relaxed or to reduce anxiety that causes a panic attack. Some medicines are taken only when you are having a panic attack. Other medicines can be taken to prevent panic attacks.      •Take your medicine as directed. Contact your healthcare provider if you think your medicine is not helping or if you have side effects. Tell him of her if you are allergic to any medicine. Keep a list of the medicines, vitamins, and herbs you take. Include the amounts, and when and why you take them. Bring the list or the pill bottles to follow-up visits. Carry your medicine list with you in case of an emergency.      Manage or prevent a panic attack:   •Manage stress. Stress can trigger a panic attack. Ways to lower your stress level include yoga, meditation, and talking to someone about the stress in your life.      •Exercise as directed. Exercise can reduce stress and help you sleep better. Try to get at least 30 minutes of physical activity on most days of the week. Your healthcare provider can help you create an exercise plan.  Walking for Exercise           •Set a sleep schedule. Too little sleep can increase anxiety. Go to bed at the same time each night and wake up at the same time each morning. Keep your room quiet and free from distractions, such as a television or computer.      •Eat a variety of healthy foods. Healthy foods include fruits, vegetables, low-fat dairy products, lean meats, fish, and beans. Limit sugar. Sugar can increase your symptoms.  Healthy Foods           •Do not have foods or drinks that contain caffeine. These include coffee, tea, soda, energy drinks, and chocolate. Caffeine can make anxiety worse or trigger a panic attack.      •Limit alcohol. You may think alcohol makes you calmer, but it is not a safe or effective way to control anxiety. Alcohol can increase anxiety if you drink large amounts or drink often. Ask your healthcare provider how much alcohol is safe for you to drink. A drink of alcohol is 12 ounces of beer, 5 ounces of wine, or 1½ ounces of liquor.      •Do not smoke. Nicotine and other chemicals in cigarettes and cigars can increase anxiety. Ask your healthcare provider for information if you currently smoke and need help to quit. E-cigarettes or smokeless tobacco still contain nicotine. Talk to your healthcare provider before you use these products.      Follow up with your doctor or therapist as directed: Write down your questions so you remember to ask them during your visits.

## 2021-04-12 NOTE — ED PROVIDER NOTE - CARE PLAN
Principal Discharge DX:	Epigastric pain  Secondary Diagnosis:	Gastroesophageal reflux disease, unspecified whether esophagitis present  Secondary Diagnosis:	Panic attack

## 2021-04-12 NOTE — ED PROVIDER NOTE - CARE PROVIDER_API CALL
Praveen Nguyễn (DO)  Internal Medicine  [address] [place] [address]  Phone: [phone]  Fax: [phone]  Follow Up Time: 1-3 Days    Eddie Phoenix  GASTROENTEROLOGY  [address] [place] [address]  Phone: [phone]  Fax: [phone]  Follow Up Time: 1-3 Days

## 2021-04-12 NOTE — ED PROVIDER NOTE - PATIENT PORTAL LINK FT
You can access the FollowMyHealth Patient Portal offered by [facility] by registering at the following website: [email or web address]. By joining [employer]’s FollowMyHealth portal, you will also be able to view your health information using other applications (apps) compatible with our system.

## 2021-04-12 NOTE — ED PROVIDER NOTE - CLINICAL SUMMARY MEDICAL DECISION MAKING FREE TEXT BOX
35 y.o. M c/o reflux/epigastric pain, very anxious, has h/o gastritis - will treat anxiety and GERD, check labs to r/o pancreatitis/hepatitis/cholestasis

## 2021-04-12 NOTE — ED PROVIDER NOTE - OBJECTIVE STATEMENT
35 y.o. M c/o bad acid pain from stomach 35 y.o. M c/o bad acid pain from stomach - pointing to epigastrium - started this morning, has h/o gastritis and H. pylori which he saw GI for in the past, took abx but says H. Pylori test was still positive after that, was supposed to follow up with another specialist but did not, not having a lot of burning from epigastrium up to throat, drank almond milk, which usually helps, but didn't today

## 2021-04-13 VITALS
OXYGEN SATURATION: 98 % | TEMPERATURE: 97 F | HEART RATE: 65 BPM | RESPIRATION RATE: 20 BRPM | SYSTOLIC BLOOD PRESSURE: 120 MMHG | DIASTOLIC BLOOD PRESSURE: 69 MMHG

## 2021-04-13 LAB
ALBUMIN SERPL ELPH-MCNC: 4.3 G/DL — SIGNIFICANT CHANGE UP (ref 3.3–5)
ALP SERPL-CCNC: 58 U/L — SIGNIFICANT CHANGE UP (ref 30–120)
ALT FLD-CCNC: 43 U/L DA — SIGNIFICANT CHANGE UP (ref 10–60)
ANION GAP SERPL CALC-SCNC: 14 MMOL/L — SIGNIFICANT CHANGE UP (ref 5–17)
AST SERPL-CCNC: 19 U/L — SIGNIFICANT CHANGE UP (ref 10–40)
BILIRUB SERPL-MCNC: 1 MG/DL — SIGNIFICANT CHANGE UP (ref 0.2–1.2)
BUN SERPL-MCNC: 17 MG/DL — SIGNIFICANT CHANGE UP (ref 7–23)
CALCIUM SERPL-MCNC: 10.4 MG/DL — SIGNIFICANT CHANGE UP (ref 8.4–10.5)
CHLORIDE SERPL-SCNC: 104 MMOL/L — SIGNIFICANT CHANGE UP (ref 96–108)
CO2 SERPL-SCNC: 24 MMOL/L — SIGNIFICANT CHANGE UP (ref 22–31)
CREAT SERPL-MCNC: 0.88 MG/DL — SIGNIFICANT CHANGE UP (ref 0.5–1.3)
GLUCOSE SERPL-MCNC: 122 MG/DL — HIGH (ref 70–99)
LIDOCAIN IGE QN: 108 U/L — SIGNIFICANT CHANGE UP (ref 73–393)
POTASSIUM SERPL-MCNC: 3.2 MMOL/L — LOW (ref 3.5–5.3)
POTASSIUM SERPL-SCNC: 3.2 MMOL/L — LOW (ref 3.5–5.3)
PROT SERPL-MCNC: 7.6 G/DL — SIGNIFICANT CHANGE UP (ref 6–8.3)
SODIUM SERPL-SCNC: 142 MMOL/L — SIGNIFICANT CHANGE UP (ref 135–145)

## 2021-04-13 RX ADMIN — SODIUM CHLORIDE 1000 MILLILITER(S): 9 INJECTION INTRAMUSCULAR; INTRAVENOUS; SUBCUTANEOUS at 00:30

## 2021-04-13 NOTE — ED ADULT NURSE NOTE - NSIMPLEMENTINTERV_GEN_ALL_ED
Implemented All Universal Safety Interventions:  [place] to call system. Call bell, personal items and telephone within reach. Instruct patient to call for assistance. Room bathroom lighting operational. Non-slip footwear when patient is off stretcher. Physically safe environment: no spills, clutter or unnecessary equipment. Stretcher in lowest position, wheels locked, appropriate side rails in place.
Right Foot/Right Hand

## 2021-04-13 NOTE — ED ADULT NURSE NOTE - OBJECTIVE STATEMENT
34yo 34yo male walked into ED, pt c/o "I have this acid feeling in my epigastric region" as per pt . pt denies nausea and vomiting . pt c/o epigastric pain secondary to "heartburn" as per pt.

## 2021-12-30 NOTE — ED PROVIDER NOTE - NEURO NEGATIVE STATEMENT, MLM
[facility]          2021        Pema Regan       : 1990  [address] [facility] [address]        To Whom It May Concern,    This is to certify that Pema Regan was seen in the clinic on  2021.    She can return to work on Monday 1/3/22 if fever free and symptoms are improving. She can return sooner if feeling better.             SIGNATURE:_________________________________________, 2021       TRISTEN Vo                                .      [facility]  [facility]  [address] [address]  [phone]   no loss of consciousness, no gait abnormality, no headache, no sensory deficits, and no weakness.

## 2022-04-10 ENCOUNTER — TRANSCRIPTION ENCOUNTER (OUTPATIENT)
Age: 37
End: 2022-04-10

## 2022-05-03 ENCOUNTER — EMERGENCY (EMERGENCY)
Facility: HOSPITAL | Age: 37
LOS: 1 days | Discharge: HOME | End: 2022-05-03
Attending: EMERGENCY MEDICINE | Admitting: EMERGENCY MEDICINE
Payer: COMMERCIAL

## 2022-05-03 VITALS
DIASTOLIC BLOOD PRESSURE: 71 MMHG | SYSTOLIC BLOOD PRESSURE: 114 MMHG | HEIGHT: 71 IN | HEART RATE: 81 BPM | RESPIRATION RATE: 15 BRPM | OXYGEN SATURATION: 99 % | TEMPERATURE: 98 F | WEIGHT: 205.25 LBS

## 2022-05-03 VITALS
HEART RATE: 88 BPM | SYSTOLIC BLOOD PRESSURE: 128 MMHG | RESPIRATION RATE: 15 BRPM | DIASTOLIC BLOOD PRESSURE: 78 MMHG | OXYGEN SATURATION: 99 %

## 2022-05-03 PROCEDURE — 93005 ELECTROCARDIOGRAM TRACING: CPT

## 2022-05-03 PROCEDURE — 99284 EMERGENCY DEPT VISIT MOD MDM: CPT

## 2022-05-03 PROCEDURE — 99284 EMERGENCY DEPT VISIT MOD MDM: CPT | Mod: 25

## 2022-05-03 PROCEDURE — 93970 EXTREMITY STUDY: CPT | Mod: 26

## 2022-05-03 PROCEDURE — 93010 ELECTROCARDIOGRAM REPORT: CPT

## 2022-05-03 PROCEDURE — 93970 EXTREMITY STUDY: CPT

## 2022-05-03 PROCEDURE — 73562 X-RAY EXAM OF KNEE 3: CPT | Mod: 26,50

## 2022-05-03 PROCEDURE — 73562 X-RAY EXAM OF KNEE 3: CPT

## 2022-05-03 RX ORDER — ACETAMINOPHEN 500 MG
650 TABLET ORAL ONCE
Refills: 0 | Status: COMPLETED | OUTPATIENT
Start: 2022-05-03 | End: 2022-05-03

## 2022-05-03 NOTE — ED PROVIDER NOTE - CLINICAL SUMMARY MEDICAL DECISION MAKING FREE TEXT BOX
Elaine YOUNG for attending Dr. Pedraza: 35 y/o male with a PMHx of anxiety, PUD referred by PMD, Dr. Alexander, for evaluation of bilateral knee pain s/p zip lining and hitting a pole 5 days ago while in the country of [place]. PMD referred for XRs and US.     Plan: XRs, US, and Tylenol.

## 2022-05-03 NOTE — ED PROVIDER NOTE - CARE PROVIDER_API CALL
Osvaldo Macias)  Orthopaedic Sports Medicine; Orthopaedic Surgery  [address]  [place] [address]  Phone: [phone]  Fax: [phone]  Follow Up Time: 1-3 Days

## 2022-05-03 NOTE — ED PROVIDER NOTE - NSFOLLOWUPINSTRUCTIONS_ED_ALL_ED_FT
1) Follow-up with Orthopedics, See referred doctor. Call today/next business day for close, prompt follow-up.  2) Return to Emergency room for any worsening or persistent pain, weakness, numbness, fever, color change to extremity, or any other concerning symptoms.  3) Take ibuprofen 600 mg every  6 hours as needed.   4) You can consider discussing with your doctor if physical therapy or further imaging as an MRI may be beneficial.       Musculoskeletal Pain      Musculoskeletal pain refers to aches and pains in your bones, joints, muscles, and the tissues that surround them. This pain can occur in any part of the body. It can last for a short time (acute) or a long time (chronic).    A physical exam, lab tests, and imaging studies may be done to find the cause of your musculoskeletal pain.      Follow these instructions at home:    Lifestyle   •Try to control or lower your stress levels. Stress increases muscle tension and can worsen musculoskeletal pain. It is important to recognize when you are anxious or stressed and learn ways to manage it. This may include:  •Meditation or yoga.      •Cognitive or behavioral therapy.      •Acupuncture or massage therapy.        •You may continue all activities unless the activities cause more pain. When the pain gets better, slowly resume your normal activities. Gradually increase the intensity and duration of your activities or exercise.        Managing pain, stiffness, and swelling                   •Treatment may include medicines for pain and inflammation that are taken by mouth or applied to the skin. Take over-the-counter and prescription medicines only as told by your health care provider.      •When your pain is severe, bed rest may be helpful. Lie or sit in any position that is comfortable, but get out of bed and walk around at least every couple of hours.    •If directed, apply heat to the affected area as often as told by your health care provider. Use the heat source that your health care provider recommends, such as a moist heat pack or a heating pad.  •Place a towel between your skin and the heat source.      •Leave the heat on for 20–30 minutes.      •Remove the heat if your skin turns bright red. This is especially important if you are unable to feel pain, heat, or cold. You may have a greater risk of getting burned.      •If directed, put ice on the painful area. To do this:  •Put ice in a plastic bag.      •Place a towel between your skin and the bag.      •Leave the ice on for 20 minutes, 2–3 times a day.      •Remove the ice if your skin turns bright red. This is very important. If you cannot feel pain, heat, or cold, you have a greater risk of damage to the area.        General instructions     •Your health care provider may recommend that you see a physical therapist. This person can help you come up with a safe exercise program.      •If told by your health care provider, do physical therapy exercises to improve movement and strength in the affected area.      •Keep all follow-up visits. This is important. This includes any physical therapy visits.        Contact a health care provider if:    •Your pain gets worse.      •Medicines do not help ease your pain.      •You cannot use the part of your body that hurts, such as your arm, leg, or neck.      •You have trouble sleeping.      •You have trouble doing your normal activities.        Get help right away if:    •You have a new injury and your pain is worse or different.      •You feel numb or you have tingling in the painful area.        Summary    •Musculoskeletal pain refers to aches and pains in your bones, joints, muscles, and the tissues that surround them.      •This pain can occur in any part of the body.      •Your health care provider may recommend that you see a physical therapist. This person can help you come up with a safe exercise program. Do any exercises as told by your physical therapist.      •Lower your stress level. Stress can worsen musculoskeletal pain. Ways to lower stress may include meditation, yoga, cognitive or behavioral therapy, acupuncture, and massage therapy.      This information is not intended to replace advice given to you by your health care provider. Make sure you discuss any questions you have with your health care provider.

## 2022-05-03 NOTE — ED PROVIDER NOTE - PHYSICAL EXAMINATION
Constitutional: Awake, Alert, non-toxic. NAD. Well appearing, well nourished.   HEAD: Normocephalic, atraumatic.   EYES: EOM intact, conjunctiva and sclera are clear bilaterally.   ENT: No rhinorrhea, patent, mucous membranes pink/moist, no drooling or stridor.   NECK: Supple, non-tender  CARDIOVASCULAR: Normal S1, S2; regular rate and rhythm.  RESPIRATORY: Normal respiratory effort; breath sounds CTAB, no wheezes, rhonchi, or rales. Speaking in full sentences. No accessory muscle use.   EXTREMITIES: Full passive and active ROM in all extremities; (+) b/l anterior knee TTP, (+) anterior knee abrasion, no laceration, (+) RLE edema, ankles and hips non-tender to palpation; distal pulses palpable and symmetric  SKIN: Warm, dry; good skin turgor, no apparent lesions or rashes, no ecchymosis, brisk capillary refill.  NEURO: A&O x3. Sensory and motor functions are grossly intact. Speech is normal. Appearance and judgement seem appropriate for gender and age.

## 2022-05-03 NOTE — ED PROVIDER NOTE - NSICDXFAMILYHX_GEN_ALL_CORE_FT
FAMILY HISTORY:  Father  Still living? Unknown  Family history of gastric ulcer, Age at diagnosis: Age Unknown    Grandparent  Still living? Unknown  Family history of gastric ulcer, Age at diagnosis: Age Unknown

## 2022-05-03 NOTE — ED ADULT NURSE NOTE - OBJECTIVE STATEMENT
patient is aaox3, injured his legs from zip line, able to walk but legs more swollen and pain got worse, right leg more swollen than left, PA at bedside, will continue to monitor.

## 2022-05-03 NOTE — ED ADULT TRIAGE NOTE - CHIEF COMPLAINT QUOTE
I injured my both leg on Thursday from zip line, smashed against pole.   patient stated he was able to walk but swollen after the incident, right leg more swollen. ambulates in triage

## 2022-05-03 NOTE — ED PROVIDER NOTE - OBJECTIVE STATEMENT
35 y/o male sent in by PCP Benjamin due to leg pain. pt reports he was in [place] 5 days ago in which he was on a zip line and went into a pole. pt reports pain to b/l knees. pt developed RLE swelling and bruising. pt was sent in by Dr. Alexander for JAY r/o DVT and Xrays. pt describes pain as aching, non-radiating, and currently 5/10. pt denies head injury, numbness/weakness, vomiting, laceration, cp, sob, hx of blood clot, or any other complaints.

## 2022-05-03 NOTE — ED PROVIDER NOTE - PATIENT PORTAL LINK FT
You can access the FollowMyHealth Patient Portal offered by [facility] by registering at the following website: [email or web address]. By joining [employer]’s FollowMyHealth portal, you will also be able to view your health information using other applications (apps) compatible with our system.

## 2022-06-02 ENCOUNTER — APPOINTMENT (OUTPATIENT)
Dept: UROLOGY | Facility: CLINIC | Age: 37
End: 2022-06-02
Payer: COMMERCIAL

## 2022-06-02 VITALS
DIASTOLIC BLOOD PRESSURE: 56 MMHG | WEIGHT: 170 LBS | SYSTOLIC BLOOD PRESSURE: 105 MMHG | HEART RATE: 57 BPM | BODY MASS INDEX: 25.18 KG/M2 | OXYGEN SATURATION: 99 % | HEIGHT: 69 IN

## 2022-06-02 DIAGNOSIS — N46.9 MALE INFERTILITY, UNSPECIFIED: ICD-10-CM

## 2022-06-02 PROCEDURE — 99203 OFFICE O/P NEW LOW 30 MIN: CPT

## 2022-06-02 NOTE — HISTORY OF PRESENT ILLNESS
[FreeTextEntry1] : 37 year old male presents for Infertility work up. \par  to 29 years old female. \par Wife got pregnant 2 years ago, they had miscarriage. \par He has 2 children: 14 and 11 years old with other partner. \par Has been told has varicocele. Has had Semen analysis: low sperm count. \par Next week has Intrauterine insemination. \par Denies any difficulty urinating.\par Normal erections and ejaculation. \par Urologic History:\par Infection	no	\par STD	no	\par Mumps	no		\par Undescended testes no	\par Testicular trauma	no	\par Genital surgery	no	\par 	\par

## 2022-06-02 NOTE — PHYSICAL EXAM
[Normal Appearance] : normal appearance [General Appearance - In No Acute Distress] : no acute distress [] : no respiratory distress [FreeTextEntry1] : Deferred per Patient request  [Normal Station and Gait] : the gait and station were normal for the patient's age [Skin Color & Pigmentation] : normal skin color and pigmentation [No Focal Deficits] : no focal deficits [Oriented To Time, Place, And Person] : oriented to person, place, and time

## 2022-06-02 NOTE — REVIEW OF SYSTEMS
[both] : pain during and after intercourse [denies] : denies pain with orgasm [base] : pain in base of penis [Anxiety] : anxiety [Depression] : depression [Negative] : Heme/Lymph

## 2022-06-02 NOTE — ASSESSMENT
[FreeTextEntry1] : Male infertility: \par Discussed infertility is defined as not being able to get pregnant (conceive) after one year (or longer) of unprotected sex. \par Discussed most fertile days are the three days leading up to and including ovulation. Having sex during this time gives the best chance of getting pregnant.\par Discussed best time to get Semen analysis is with minimum of 2 days and a maximum of 7 days of sexual abstinence. \par Will get Semen analysis. \par Will get Total, Free Testosterone with LH, FSH and PRL. \par Will get Scrotal Ultrasound. \par \par Return to office after Ultrasound. \par \par

## 2022-08-16 NOTE — ED PROVIDER NOTE - NSDCPRINTRESULTS_ED_ALL_ED
Chief complaint:  Patient with anxiety, depression, disrupted sleep and history of alcohol abuse     Current status:  The patient utilized 2 doses of p.r.n. Valium yesterday for anxiety.  The patient continues to utilize p.r.n. Ambien nightly for difficulty sleeping.  He notes that he did not fall asleep after taking the trazodone last night so he took Ambien, fell asleep for several hours, then woke up again around 5:30 a.m. feeling very cold.  He notes his anxiety level and mood have improved some.  He states he has worked out a plan with his son to keep his insulin pens for him so he does not overdose when he leaves.  He notes his son lives 45 minutes away in [place].  His [facility] tried to set up some outpatient counseling for him but every place she contacted is booking too far out or has waiting list.  The patient notes that he has been attending programming here and that he found it very helpful yesterday.  He denies having any medication side effects such as drowsiness, upset stomach or dizziness.    Mental status exam:  The patient is well groomed and has a steady gait.  He appears at ease and has a polite demeanor.  He is alert, oriented x3 with intact recent and remote memory for historical information.  Normal attention span.  Insight and judgment intact, patient engaged in treatment.  Speech normal rate.  Affect animated and mood less depressed.  Normal rate of thoughts.  Last negativity.  Some suicidal thoughts.      Assessment: Primary diagnosis: Major depression recurrent moderate to severe     Secondary diagnoses:  Insomnia, anxiety disorder NOS, alcohol use disorder     Plan:  Will add melatonin, continue programming, consider IOP/PHP possibilities.  Staffing to be held this morning   Patient requests all Lab, Cardiology, and Radiology Results on their Discharge Instructions

## 2024-03-17 ENCOUNTER — EMERGENCY (EMERGENCY)
Facility: HOSPITAL | Age: 39
LOS: 1 days | Discharge: HOME | End: 2024-03-17
Attending: EMERGENCY MEDICINE | Admitting: EMERGENCY MEDICINE
Payer: COMMERCIAL

## 2024-03-17 VITALS
DIASTOLIC BLOOD PRESSURE: 71 MMHG | SYSTOLIC BLOOD PRESSURE: 120 MMHG | OXYGEN SATURATION: 100 % | HEART RATE: 74 BPM | RESPIRATION RATE: 18 BRPM | TEMPERATURE: 98 F

## 2024-03-17 VITALS
DIASTOLIC BLOOD PRESSURE: 74 MMHG | WEIGHT: 214.95 LBS | HEART RATE: 70 BPM | TEMPERATURE: 97 F | SYSTOLIC BLOOD PRESSURE: 115 MMHG | RESPIRATION RATE: 18 BRPM | OXYGEN SATURATION: 99 %

## 2024-03-17 DIAGNOSIS — Z90.49 ACQUIRED ABSENCE OF OTHER SPECIFIED PARTS OF DIGESTIVE TRACT: Chronic | ICD-10-CM

## 2024-03-17 LAB
ALBUMIN SERPL ELPH-MCNC: 4.2 G/DL — SIGNIFICANT CHANGE UP (ref 3.3–5)
ALP SERPL-CCNC: 55 U/L — SIGNIFICANT CHANGE UP (ref 40–120)
ALT FLD-CCNC: 36 U/L — SIGNIFICANT CHANGE UP (ref 12–78)
ANION GAP SERPL CALC-SCNC: 9 MMOL/L — SIGNIFICANT CHANGE UP (ref 5–17)
AST SERPL-CCNC: 20 U/L — SIGNIFICANT CHANGE UP (ref 15–37)
BASOPHILS # BLD AUTO: 0.02 K/UL — SIGNIFICANT CHANGE UP (ref 0–0.2)
BASOPHILS NFR BLD AUTO: 0.2 % — SIGNIFICANT CHANGE UP (ref 0–2)
BILIRUB SERPL-MCNC: 1 MG/DL — SIGNIFICANT CHANGE UP (ref 0.2–1.2)
BLD GP AB SCN SERPL QL: SIGNIFICANT CHANGE UP
BUN SERPL-MCNC: 16 MG/DL — SIGNIFICANT CHANGE UP (ref 7–23)
CALCIUM SERPL-MCNC: 9.4 MG/DL — SIGNIFICANT CHANGE UP (ref 8.5–10.1)
CHLORIDE SERPL-SCNC: 109 MMOL/L — HIGH (ref 96–108)
CO2 SERPL-SCNC: 27 MMOL/L — SIGNIFICANT CHANGE UP (ref 22–31)
CREAT SERPL-MCNC: 0.8 MG/DL — SIGNIFICANT CHANGE UP (ref 0.5–1.3)
EGFR: 118 ML/MIN/1.73M2 — SIGNIFICANT CHANGE UP
EOSINOPHIL # BLD AUTO: 0.04 K/UL — SIGNIFICANT CHANGE UP (ref 0–0.5)
EOSINOPHIL NFR BLD AUTO: 0.4 % — SIGNIFICANT CHANGE UP (ref 0–6)
GLUCOSE SERPL-MCNC: 121 MG/DL — HIGH (ref 70–99)
HCT VFR BLD CALC: 44.8 % — SIGNIFICANT CHANGE UP (ref 39–50)
HGB BLD-MCNC: 15.9 G/DL — SIGNIFICANT CHANGE UP (ref 13–17)
IMM GRANULOCYTES NFR BLD AUTO: 0.3 % — SIGNIFICANT CHANGE UP (ref 0–0.9)
LACTATE SERPL-SCNC: 1.3 MMOL/L — SIGNIFICANT CHANGE UP (ref 0.7–2)
LIDOCAIN IGE QN: 25 U/L — SIGNIFICANT CHANGE UP (ref 13–75)
LYMPHOCYTES # BLD AUTO: 1.27 K/UL — SIGNIFICANT CHANGE UP (ref 1–3.3)
LYMPHOCYTES # BLD AUTO: 11.7 % — LOW (ref 13–44)
MCHC RBC-ENTMCNC: 29.5 PG — SIGNIFICANT CHANGE UP (ref 27–34)
MCHC RBC-ENTMCNC: 35.5 GM/DL — SIGNIFICANT CHANGE UP (ref 32–36)
MCV RBC AUTO: 83.1 FL — SIGNIFICANT CHANGE UP (ref 80–100)
MONOCYTES # BLD AUTO: 0.63 K/UL — SIGNIFICANT CHANGE UP (ref 0–0.9)
MONOCYTES NFR BLD AUTO: 5.8 % — SIGNIFICANT CHANGE UP (ref 2–14)
NEUTROPHILS # BLD AUTO: 8.91 K/UL — HIGH (ref 1.8–7.4)
NEUTROPHILS NFR BLD AUTO: 81.6 % — HIGH (ref 43–77)
NRBC # BLD: 0 /100 WBCS — SIGNIFICANT CHANGE UP (ref 0–0)
PLATELET # BLD AUTO: 235 K/UL — SIGNIFICANT CHANGE UP (ref 150–400)
POTASSIUM SERPL-MCNC: 3.8 MMOL/L — SIGNIFICANT CHANGE UP (ref 3.5–5.3)
POTASSIUM SERPL-SCNC: 3.8 MMOL/L — SIGNIFICANT CHANGE UP (ref 3.5–5.3)
PROT SERPL-MCNC: 7.4 G/DL — SIGNIFICANT CHANGE UP (ref 6–8.3)
RBC # BLD: 5.39 M/UL — SIGNIFICANT CHANGE UP (ref 4.2–5.8)
RBC # FLD: 12.1 % — SIGNIFICANT CHANGE UP (ref 10.3–14.5)
SODIUM SERPL-SCNC: 145 MMOL/L — SIGNIFICANT CHANGE UP (ref 135–145)
WBC # BLD: 10.9 K/UL — HIGH (ref 3.8–10.5)
WBC # FLD AUTO: 10.9 K/UL — HIGH (ref 3.8–10.5)

## 2024-03-17 PROCEDURE — 96372 THER/PROPH/DIAG INJ SC/IM: CPT | Mod: XU

## 2024-03-17 PROCEDURE — 74177 CT ABD & PELVIS W/CONTRAST: CPT | Mod: 26,MC

## 2024-03-17 PROCEDURE — 86901 BLOOD TYPING SEROLOGIC RH(D): CPT

## 2024-03-17 PROCEDURE — 74177 CT ABD & PELVIS W/CONTRAST: CPT | Mod: MC

## 2024-03-17 PROCEDURE — 80053 COMPREHEN METABOLIC PANEL: CPT

## 2024-03-17 PROCEDURE — 99285 EMERGENCY DEPT VISIT HI MDM: CPT

## 2024-03-17 PROCEDURE — 85025 COMPLETE CBC W/AUTO DIFF WBC: CPT

## 2024-03-17 PROCEDURE — 99285 EMERGENCY DEPT VISIT HI MDM: CPT | Mod: 25

## 2024-03-17 PROCEDURE — 93005 ELECTROCARDIOGRAM TRACING: CPT

## 2024-03-17 PROCEDURE — 86900 BLOOD TYPING SEROLOGIC ABO: CPT

## 2024-03-17 PROCEDURE — 71045 X-RAY EXAM CHEST 1 VIEW: CPT

## 2024-03-17 PROCEDURE — 83690 ASSAY OF LIPASE: CPT

## 2024-03-17 PROCEDURE — 93010 ELECTROCARDIOGRAM REPORT: CPT

## 2024-03-17 PROCEDURE — 86850 RBC ANTIBODY SCREEN: CPT

## 2024-03-17 PROCEDURE — 83605 ASSAY OF LACTIC ACID: CPT

## 2024-03-17 PROCEDURE — 36415 COLL VENOUS BLD VENIPUNCTURE: CPT

## 2024-03-17 PROCEDURE — 96374 THER/PROPH/DIAG INJ IV PUSH: CPT | Mod: XU

## 2024-03-17 PROCEDURE — 96375 TX/PRO/DX INJ NEW DRUG ADDON: CPT

## 2024-03-17 PROCEDURE — 71045 X-RAY EXAM CHEST 1 VIEW: CPT | Mod: 26

## 2024-03-17 RX ORDER — SODIUM CHLORIDE 9 MG/ML
1000 INJECTION INTRAMUSCULAR; INTRAVENOUS; SUBCUTANEOUS ONCE
Refills: 0 | Status: COMPLETED | OUTPATIENT
Start: 2024-03-17 | End: 2024-03-17

## 2024-03-17 RX ORDER — METRONIDAZOLE 500 MG
500 TABLET ORAL ONCE
Refills: 0 | Status: COMPLETED | OUTPATIENT
Start: 2024-03-17 | End: 2024-03-17

## 2024-03-17 RX ORDER — PANTOPRAZOLE SODIUM 20 MG/1
40 TABLET, DELAYED RELEASE ORAL ONCE
Refills: 0 | Status: COMPLETED | OUTPATIENT
Start: 2024-03-17 | End: 2024-03-17

## 2024-03-17 RX ORDER — ACETAMINOPHEN 500 MG
1000 TABLET ORAL ONCE
Refills: 0 | Status: COMPLETED | OUTPATIENT
Start: 2024-03-17 | End: 2024-03-17

## 2024-03-17 RX ORDER — IOHEXOL 300 MG/ML
30 INJECTION, SOLUTION INTRAVENOUS ONCE
Refills: 0 | Status: COMPLETED | OUTPATIENT
Start: 2024-03-17 | End: 2024-03-17

## 2024-03-17 RX ORDER — METRONIDAZOLE 500 MG
1 TABLET ORAL
Qty: 21 | Refills: 0
Start: 2024-03-17

## 2024-03-17 RX ORDER — KETOROLAC TROMETHAMINE 30 MG/ML
30 SYRINGE (ML) INJECTION ONCE
Refills: 0 | Status: DISCONTINUED | OUTPATIENT
Start: 2024-03-17 | End: 2024-03-17

## 2024-03-17 RX ORDER — ONDANSETRON 8 MG/1
1 TABLET, FILM COATED ORAL
Qty: 30 | Refills: 0
Start: 2024-03-17

## 2024-03-17 RX ORDER — ONDANSETRON 8 MG/1
4 TABLET, FILM COATED ORAL ONCE
Refills: 0 | Status: COMPLETED | OUTPATIENT
Start: 2024-03-17 | End: 2024-03-17

## 2024-03-17 RX ADMIN — SODIUM CHLORIDE 1000 MILLILITER(S): 9 INJECTION INTRAMUSCULAR; INTRAVENOUS; SUBCUTANEOUS at 10:38

## 2024-03-17 RX ADMIN — SODIUM CHLORIDE 1000 MILLILITER(S): 9 INJECTION INTRAMUSCULAR; INTRAVENOUS; SUBCUTANEOUS at 14:48

## 2024-03-17 RX ADMIN — Medication 400 MILLIGRAM(S): at 10:37

## 2024-03-17 RX ADMIN — Medication 500 MILLIGRAM(S): at 16:01

## 2024-03-17 RX ADMIN — Medication 30 MILLIGRAM(S): at 16:01

## 2024-03-17 RX ADMIN — Medication 10 MILLIGRAM(S): at 16:01

## 2024-03-17 RX ADMIN — IOHEXOL 30 MILLILITER(S): 300 INJECTION, SOLUTION INTRAVENOUS at 10:37

## 2024-03-17 RX ADMIN — ONDANSETRON 4 MILLIGRAM(S): 8 TABLET, FILM COATED ORAL at 10:37

## 2024-03-17 RX ADMIN — PANTOPRAZOLE SODIUM 40 MILLIGRAM(S): 20 TABLET, DELAYED RELEASE ORAL at 10:38

## 2024-03-17 NOTE — ED PROVIDER NOTE - TOBACCO USE
VR      Hello, patient has some questions regarding his lab slips. He will like a call back at [phone].      Thank you!    Former smoker

## 2024-03-17 NOTE — ED PROVIDER NOTE - NSFOLLOWUPINSTRUCTIONS_ED_ALL_ED_FT
Flagyl 1 pill three times per day  Tylenol or ibuprofen for pain  follow up with dr Nguyễn-gi or your personal GI specialist  Clear liquid diet advance slowly as tolerated to  Bananas Rice Tea and Toast (with margarine or jam)  then advance to Baked and boiled (eggs, pasta, chicken)  once tolerated you may advance slowly to regular  Eat small volumes   NO fatty fried foods, no milk or mild products, no tomato, spice, mint, tobacco, alcohol, caffeine  Stay well hydrated: a teaspoon at a time until able to tolerate normal intake.  Prompt follow up with your doctor is essential  if given zofran, use this every 6 hours for nausea.  return for worsening symptoms or any problems/concerns

## 2024-03-17 NOTE — ED PROVIDER NOTE - CARE PLAN
1 Principal Discharge DX:	Epigastric abdominal pain  Secondary Diagnosis:	Nausea and vomiting   Principal Discharge DX:	Epigastric abdominal pain  Secondary Diagnosis:	Nausea and vomiting  Secondary Diagnosis:	Colitis

## 2024-03-17 NOTE — ED PROVIDER NOTE - PATIENT PORTAL LINK FT
You can access the FollowMyHealth Patient Portal offered by [facility] by registering at the following website: [email or web address]. By joining [facility]’s FollowMyHealth portal, you will also be able to view your health information using other applications (apps) compatible with our system.

## 2024-03-17 NOTE — ED PROVIDER NOTE - PROGRESS NOTE DETAILS
pain better ct called at 12:30 to inquire about ct study- pt on table,  pt was complaining of redness to hands but no other findings of allergy.  await ct dw radiology dr raymond to clarify the ct read- there is mild colitis without inflammatory stranding.  there is no abscess or fluid collection the diarrheal state is likely due to volume of fluid normally re-absorbed by that location in colon and is also reflective of colitis that is mild given setting of normal labs. Reevaluated patient at bedside.  Patient feeling much improved.  Discussed the results of all diagnostic testing in ED and copies of all reports given.   An opportunity to ask questions was given.  Discussed the importance of prompt, close medical follow-up.  Patient will return with any changes, concerns or persistent / worsening symptoms.  Understanding of all instructions verbalized. after explaining results to pt and pt's wife providing copies of tests and reviewing plan of care, patients mother came in and asked questions. with patient permission I again explained diagnosis and plan of care answering additional questions from patient

## 2024-03-17 NOTE — ED ADULT NURSE NOTE - NSFALLUNIVINTERV_ED_ALL_ED
Bed/Stretcher in lowest position, wheels locked, appropriate side rails in place/Call bell, personal items and telephone in reach/Instruct patient to call for assistance before getting out of bed/chair/stretcher/Non-slip footwear applied when patient is off stretcher/[place] to call system/Physically safe environment - no spills, clutter or unnecessary equipment/Purposeful proactive rounding/Room/bathroom lighting operational, light cord in reach

## 2024-03-17 NOTE — ED PROVIDER NOTE - CONSTITUTIONAL, MLM
Well appearing, awake, alert, oriented to person, place, time/situation and co pain to epigastrum preferring to lay on  his side but is able to sit and stand non-toxic appearing normal...

## 2024-03-17 NOTE — ED ADULT NURSE REASSESSMENT NOTE - NS ED NURSE REASSESS COMMENT FT1
1318:  patient states his belly is better, pain subsided.  now states the palms of his hands are red and itchy.  MD made aware.  the patient has not had medication since 10:30 this am.  MD stated likely not related to medication.  There is no other evidence of rash noted, no sob, no respiratory distress.  family remains at bedside.  barbara padilla.

## 2024-03-17 NOTE — ED PROVIDER NOTE - CLINICAL SUMMARY MEDICAL DECISION MAKING FREE TEXT BOX
38 male presents to the emergency room with epigastric abdominal discomfort stomach burning.  History of H. pylori in the past.  Remote former smoker.  No ill contacts trauma or travel.  Physical examination is significant for just mild epigastric discomfort to palpation without guarding or rebound.  No CVA tenderness.  Patient is nontoxic-appearing.  Plan of care includes PPI, and analgesics, IV fluids, laboratory studies, CT imaging to rule out perforated peptic ulcer disease.  Patient status post cholecystectomy rule out retained gallstone or pancreatitis.  Chest x-ray, rule out free air.  Rule out pneumonia.  [facility]  search completed negative for any recent prescriptions.  This chart was made with dictation software and may contain typographical errors.

## 2024-03-17 NOTE — ED PROVIDER NOTE - OBJECTIVE STATEMENT
pmd dr sanchez  Patient is a 38-year-old male with a history of peptic ulcer disease, anxiety, former smoker, not a drinker or drug user.  No drug allergies.  Was previously treated for H. pylori by gastroenterologist in [place].  Status post cholecystectomy.  Presents the emergency room with epigastric abdominal discomfort with nausea and vomiting and belching, describing the pain as stomach burning.  The symptoms of burning began before and occurred after episodes of emesis.  He presents emergency room with his significant other for evaluation of his epigastric abdominal pain.  He denies trauma or travel.  He denies ill contacts.  He denies hematuria hematemesis or hematochezia.

## 2024-03-17 NOTE — ED ADULT NURSE NOTE - OBJECTIVE STATEMENT
the patient presents to the er with complaints of epi gastric pain, repeatedly stating that his stomach is burning.  he is alert / oriented x4, no respiratory distress  he states that he developed n/v/d and excessive burping since last night.  He denies any recent travel, sick contacts, drinking or drug use.  He states that he has been treated in the past for H-pylori and also had his gallbladder removed.  wife at bedside.  iv access #20 placed to right ac, labs collected and medicated as ordered.

## 2024-03-17 NOTE — ED PROVIDER NOTE - CARE PROVIDER_API CALL
Praveen Nguyễn  Gastroenterology  [address] [place] [address]  Phone: [phone]  Fax: [phone]  Follow Up Time:

## 2024-05-08 ENCOUNTER — NON-APPOINTMENT (OUTPATIENT)
Age: 39
End: 2024-05-08

## 2024-08-19 NOTE — ED ADULT NURSE NOTE - CHIEF COMPLAINT QUOTE
Pt c/o R sided abd pain x 1 month that worsened. Denies N/V/D, urinary symptoms, fever/chills. Hx: H. pylori
Pt received to intake 10C, A&Ox3. Pt endorsing cough, SOB x 1 day. HX of asthma. Pt recently recovering from COVID. Pt denies fevers, chills, headache, dizziness, N/V/D. respirations even and unlabored. pending xray. safety maintained